# Patient Record
Sex: MALE | Race: WHITE | NOT HISPANIC OR LATINO | Employment: OTHER | URBAN - METROPOLITAN AREA
[De-identification: names, ages, dates, MRNs, and addresses within clinical notes are randomized per-mention and may not be internally consistent; named-entity substitution may affect disease eponyms.]

---

## 2022-04-10 ENCOUNTER — HISTORICAL (OUTPATIENT)
Dept: ADMINISTRATIVE | Facility: HOSPITAL | Age: 87
End: 2022-04-10
Payer: MEDICARE

## 2022-04-30 VITALS
SYSTOLIC BLOOD PRESSURE: 108 MMHG | WEIGHT: 164 LBS | BODY MASS INDEX: 23.48 KG/M2 | HEIGHT: 70 IN | DIASTOLIC BLOOD PRESSURE: 50 MMHG

## 2022-07-18 VITALS
DIASTOLIC BLOOD PRESSURE: 50 MMHG | BODY MASS INDEX: 23.48 KG/M2 | SYSTOLIC BLOOD PRESSURE: 108 MMHG | WEIGHT: 164 LBS | HEIGHT: 70 IN

## 2022-07-18 RX ORDER — MEMANTINE HYDROCHLORIDE 10 MG/1
10 TABLET ORAL 2 TIMES DAILY
COMMUNITY
Start: 2022-01-19 | End: 2022-07-19 | Stop reason: SDUPTHER

## 2022-07-18 RX ORDER — IPRATROPIUM BROMIDE 42 UG/1
2 SPRAY, METERED NASAL 4 TIMES DAILY
COMMUNITY
Start: 2022-01-19

## 2022-07-19 ENCOUNTER — OFFICE VISIT (OUTPATIENT)
Dept: NEUROLOGY | Facility: CLINIC | Age: 87
End: 2022-07-19
Payer: MEDICARE

## 2022-07-19 VITALS
HEIGHT: 70 IN | BODY MASS INDEX: 23.48 KG/M2 | SYSTOLIC BLOOD PRESSURE: 98 MMHG | DIASTOLIC BLOOD PRESSURE: 62 MMHG | WEIGHT: 164 LBS

## 2022-07-19 DIAGNOSIS — F02.80 ALZHEIMER DISEASE: Primary | ICD-10-CM

## 2022-07-19 DIAGNOSIS — G30.9 ALZHEIMER DISEASE: Primary | ICD-10-CM

## 2022-07-19 PROCEDURE — 3078F DIAST BP <80 MM HG: CPT | Mod: CPTII,S$GLB,, | Performed by: PSYCHIATRY & NEUROLOGY

## 2022-07-19 PROCEDURE — 3288F FALL RISK ASSESSMENT DOCD: CPT | Mod: CPTII,S$GLB,, | Performed by: PSYCHIATRY & NEUROLOGY

## 2022-07-19 PROCEDURE — 3074F SYST BP LT 130 MM HG: CPT | Mod: CPTII,S$GLB,, | Performed by: PSYCHIATRY & NEUROLOGY

## 2022-07-19 PROCEDURE — 3074F PR MOST RECENT SYSTOLIC BLOOD PRESSURE < 130 MM HG: ICD-10-PCS | Mod: CPTII,S$GLB,, | Performed by: PSYCHIATRY & NEUROLOGY

## 2022-07-19 PROCEDURE — 99999 PR PBB SHADOW E&M-EST. PATIENT-LVL III: CPT | Mod: PBBFAC,,, | Performed by: PSYCHIATRY & NEUROLOGY

## 2022-07-19 PROCEDURE — 1101F PT FALLS ASSESS-DOCD LE1/YR: CPT | Mod: CPTII,S$GLB,, | Performed by: PSYCHIATRY & NEUROLOGY

## 2022-07-19 PROCEDURE — 3078F PR MOST RECENT DIASTOLIC BLOOD PRESSURE < 80 MM HG: ICD-10-PCS | Mod: CPTII,S$GLB,, | Performed by: PSYCHIATRY & NEUROLOGY

## 2022-07-19 PROCEDURE — 3288F PR FALLS RISK ASSESSMENT DOCUMENTED: ICD-10-PCS | Mod: CPTII,S$GLB,, | Performed by: PSYCHIATRY & NEUROLOGY

## 2022-07-19 PROCEDURE — 1101F PR PT FALLS ASSESS DOC 0-1 FALLS W/OUT INJ PAST YR: ICD-10-PCS | Mod: CPTII,S$GLB,, | Performed by: PSYCHIATRY & NEUROLOGY

## 2022-07-19 PROCEDURE — 99214 OFFICE O/P EST MOD 30 MIN: CPT | Mod: S$GLB,,, | Performed by: PSYCHIATRY & NEUROLOGY

## 2022-07-19 PROCEDURE — 99999 PR PBB SHADOW E&M-EST. PATIENT-LVL III: ICD-10-PCS | Mod: PBBFAC,,, | Performed by: PSYCHIATRY & NEUROLOGY

## 2022-07-19 PROCEDURE — 1159F PR MEDICATION LIST DOCUMENTED IN MEDICAL RECORD: ICD-10-PCS | Mod: CPTII,S$GLB,, | Performed by: PSYCHIATRY & NEUROLOGY

## 2022-07-19 PROCEDURE — 1159F MED LIST DOCD IN RCRD: CPT | Mod: CPTII,S$GLB,, | Performed by: PSYCHIATRY & NEUROLOGY

## 2022-07-19 PROCEDURE — 99214 PR OFFICE/OUTPT VISIT, EST, LEVL IV, 30-39 MIN: ICD-10-PCS | Mod: S$GLB,,, | Performed by: PSYCHIATRY & NEUROLOGY

## 2022-07-19 RX ORDER — TAMSULOSIN HYDROCHLORIDE 0.4 MG/1
0.4 CAPSULE ORAL DAILY
COMMUNITY

## 2022-07-19 RX ORDER — EPINEPHRINE 0.3 MG/.3ML
1 INJECTION SUBCUTANEOUS ONCE
COMMUNITY

## 2022-07-19 RX ORDER — FUROSEMIDE 20 MG/1
20 TABLET ORAL
COMMUNITY

## 2022-07-19 RX ORDER — TELMISARTAN 40 MG/1
TABLET ORAL DAILY
COMMUNITY

## 2022-07-19 RX ORDER — MEMANTINE HYDROCHLORIDE 10 MG/1
10 TABLET ORAL 2 TIMES DAILY
Qty: 180 TABLET | Refills: 3 | Status: SHIPPED | OUTPATIENT
Start: 2022-07-19 | End: 2023-07-18 | Stop reason: SDUPTHER

## 2022-07-19 RX ORDER — DONEPEZIL HYDROCHLORIDE 10 MG/1
10 TABLET, FILM COATED ORAL NIGHTLY
COMMUNITY
End: 2022-07-19 | Stop reason: SDUPTHER

## 2022-07-19 RX ORDER — DONEPEZIL HYDROCHLORIDE 10 MG/1
10 TABLET, FILM COATED ORAL DAILY
Qty: 90 TABLET | Refills: 3 | Status: SHIPPED | OUTPATIENT
Start: 2022-07-19 | End: 2023-07-18 | Stop reason: SDUPTHER

## 2022-07-19 RX ORDER — ROSUVASTATIN CALCIUM 20 MG/1
20 TABLET, COATED ORAL DAILY
COMMUNITY

## 2022-07-19 NOTE — PROGRESS NOTES
"Subjective:       Patient ID: Karon Gauthier is a 86 y.o. male.    Chief Complaint: Dementia (C/o difficulty with names. Wife states "about the same". Recent return from [place], handled trip well)    HPI no new medical issues  No flals/hallucinations  In good health  Handled [place] trip great  Remembers most family members  On aricept/namenda  Wife had dz related ?s    mini-mental status exam    Review of Systems    The remainder of the 14 system ROS is noncontributory or negative unless mentioned/reviewed above.    Objective:      Physical Exam  Mental Status: Alert and oriented x3. Language is fluent with good comprehension.    Cranial Nerve: Pupils are equal, round, and reactive to light. Visual fields are intact to confrontation. Normal fundi. Ocular movements are intact. Face is symmetric at rest and with activation with intact sensation throughout. Hearing intact to finger rub bilaterally. Muscles of tongue and palate activate symmetrically. No dysarthria. Strength is full in sternocleidomastoid and trapezius bilaterally.    Motor: Muscle bulk and tone are normal. Strength is 5/5 in all four extremities both proximally and distally. Intact fine motor movements bilaterally. There is no pronator drift or satelliting on arm roll.    Sensory: Sensation is intact to light touch, pinprick, vibration, and proprioception throughout. Romberg is negative.    Reflexes: 2+ and symmetric at the biceps, triceps, brachioradialis, patella, and Achilles bilaterally. Plantar response is flexor bilaterally.    Coordination: No dysmetria on finger-nose-finger or heel-knee-shin. Normal rapid alternating movements. Fast finger tapping with normal amplitude and speed.    Gait: Narrow based with normal stride length and good arm swing bilaterally. Able to walk on heels, toes, and in tandem.    Assessment:       Problem List Items Addressed This Visit    None     Visit Diagnoses     Alzheimer disease    -  Primary    Relevant " Medications    donepeziL (ARICEPT) 10 MG tablet    memantine (NAMENDA) 10 MG Tab          Plan:       AD  Early  Aricept/namenda  rtc 1 year

## 2023-07-18 ENCOUNTER — OFFICE VISIT (OUTPATIENT)
Dept: NEUROLOGY | Facility: CLINIC | Age: 88
End: 2023-07-18
Payer: MEDICARE

## 2023-07-18 VITALS
BODY MASS INDEX: 25.03 KG/M2 | WEIGHT: 174.81 LBS | DIASTOLIC BLOOD PRESSURE: 62 MMHG | HEIGHT: 70 IN | SYSTOLIC BLOOD PRESSURE: 142 MMHG

## 2023-07-18 DIAGNOSIS — F02.80 ALZHEIMER DISEASE: ICD-10-CM

## 2023-07-18 DIAGNOSIS — G30.9 ALZHEIMER DISEASE: ICD-10-CM

## 2023-07-18 PROCEDURE — 1101F PR PT FALLS ASSESS DOC 0-1 FALLS W/OUT INJ PAST YR: ICD-10-PCS | Mod: CPTII,S$GLB,, | Performed by: PSYCHIATRY & NEUROLOGY

## 2023-07-18 PROCEDURE — 3288F PR FALLS RISK ASSESSMENT DOCUMENTED: ICD-10-PCS | Mod: CPTII,S$GLB,, | Performed by: PSYCHIATRY & NEUROLOGY

## 2023-07-18 PROCEDURE — 3078F DIAST BP <80 MM HG: CPT | Mod: CPTII,S$GLB,, | Performed by: PSYCHIATRY & NEUROLOGY

## 2023-07-18 PROCEDURE — 1101F PT FALLS ASSESS-DOCD LE1/YR: CPT | Mod: CPTII,S$GLB,, | Performed by: PSYCHIATRY & NEUROLOGY

## 2023-07-18 PROCEDURE — 99213 OFFICE O/P EST LOW 20 MIN: CPT | Mod: S$GLB,,, | Performed by: PSYCHIATRY & NEUROLOGY

## 2023-07-18 PROCEDURE — 3078F PR MOST RECENT DIASTOLIC BLOOD PRESSURE < 80 MM HG: ICD-10-PCS | Mod: CPTII,S$GLB,, | Performed by: PSYCHIATRY & NEUROLOGY

## 2023-07-18 PROCEDURE — 99999 PR PBB SHADOW E&M-EST. PATIENT-LVL III: ICD-10-PCS | Mod: PBBFAC,,, | Performed by: PSYCHIATRY & NEUROLOGY

## 2023-07-18 PROCEDURE — 3288F FALL RISK ASSESSMENT DOCD: CPT | Mod: CPTII,S$GLB,, | Performed by: PSYCHIATRY & NEUROLOGY

## 2023-07-18 PROCEDURE — 3077F SYST BP >= 140 MM HG: CPT | Mod: CPTII,S$GLB,, | Performed by: PSYCHIATRY & NEUROLOGY

## 2023-07-18 PROCEDURE — 99999 PR PBB SHADOW E&M-EST. PATIENT-LVL III: CPT | Mod: PBBFAC,,, | Performed by: PSYCHIATRY & NEUROLOGY

## 2023-07-18 PROCEDURE — 1159F MED LIST DOCD IN RCRD: CPT | Mod: CPTII,S$GLB,, | Performed by: PSYCHIATRY & NEUROLOGY

## 2023-07-18 PROCEDURE — 3077F PR MOST RECENT SYSTOLIC BLOOD PRESSURE >= 140 MM HG: ICD-10-PCS | Mod: CPTII,S$GLB,, | Performed by: PSYCHIATRY & NEUROLOGY

## 2023-07-18 PROCEDURE — 1159F PR MEDICATION LIST DOCUMENTED IN MEDICAL RECORD: ICD-10-PCS | Mod: CPTII,S$GLB,, | Performed by: PSYCHIATRY & NEUROLOGY

## 2023-07-18 PROCEDURE — 99213 PR OFFICE/OUTPT VISIT, EST, LEVL III, 20-29 MIN: ICD-10-PCS | Mod: S$GLB,,, | Performed by: PSYCHIATRY & NEUROLOGY

## 2023-07-18 RX ORDER — MEMANTINE HYDROCHLORIDE 10 MG/1
10 TABLET ORAL 2 TIMES DAILY
Qty: 180 TABLET | Refills: 3 | Status: SHIPPED | OUTPATIENT
Start: 2023-07-18

## 2023-07-18 RX ORDER — MONTELUKAST SODIUM 10 MG/1
10 TABLET ORAL NIGHTLY
COMMUNITY
Start: 2023-07-06

## 2023-07-18 RX ORDER — DONEPEZIL HYDROCHLORIDE 10 MG/1
10 TABLET, FILM COATED ORAL DAILY
Qty: 90 TABLET | Refills: 3 | Status: SHIPPED | OUTPATIENT
Start: 2023-07-18

## 2023-07-18 RX ORDER — OMEPRAZOLE 40 MG/1
40 CAPSULE, DELAYED RELEASE ORAL
COMMUNITY
Start: 2023-06-26

## 2023-07-18 RX ORDER — SILDENAFIL 50 MG/1
50 TABLET, FILM COATED ORAL
COMMUNITY
Start: 2023-03-22

## 2023-07-18 NOTE — PROGRESS NOTES
Subjective     Patient ID: Karon Gauthier is a 87 y.o. male.    Chief Complaint: Alzheimer disease    HPI  Anomia is worse  No major decline  Local driving is ok  On aricept/namenda  Independent with ADLs  Review of Systems  The remainder of the 14 system ROS is noncontributory or negative unless mentioned/reviewed above.       Objective     Physical Exam  Mental Status: Alert and oriented x3. Language is fluent with good comprehension.    Cranial Nerve: Pupils are equal, round, and reactive to light. Visual fields are intact to confrontation. Normal fundi. Ocular movements are intact. Face is symmetric at rest and with activation with intact sensation throughout. Hearing intact to finger rub bilaterally. Muscles of tongue and palate activate symmetrically. No dysarthria. Strength is full in sternocleidomastoid and trapezius bilaterally.    Motor: Muscle bulk and tone are normal. Strength is 5/5 in all four extremities both proximally and distally. Intact fine motor movements bilaterally. There is no pronator drift or satelliting on arm roll.    Sensory: Sensation is intact to light touch, pinprick, vibration, and proprioception throughout. Romberg is negative.    Reflexes: 2+ and symmetric at the biceps, triceps, brachioradialis, patella, and Achilles bilaterally. Plantar response is flexor bilaterally.    Coordination: No dysmetria on finger-nose-finger or heel-knee-shin. Normal rapid alternating movements. Fast finger tapping with normal amplitude and speed.    Gait: Narrow based with normal stride length and good arm swing bilaterally. Able to walk on heels, toes, and in tandem.       Assessment and Plan     1. Alzheimer disease  -     donepeziL (ARICEPT) 10 MG tablet; Take 1 tablet (10 mg total) by mouth once daily.  Dispense: 90 tablet; Refill: 3  -     memantine (NAMENDA) 10 MG Tab; Take 1 tablet (10 mg total) by mouth 2 (two) times daily.  Dispense: 180 tablet; Refill: 3        Meds refilled            Follow up in about 1 year (around 7/18/2024).

## 2023-12-13 ENCOUNTER — HOSPITAL ENCOUNTER (INPATIENT)
Facility: HOSPITAL | Age: 88
LOS: 2 days | Discharge: HOME HEALTH | DRG: 084 | End: 2023-12-15
Attending: EMERGENCY MEDICINE | Admitting: SURGERY
Payer: MEDICARE

## 2023-12-13 ENCOUNTER — TELEPHONE (OUTPATIENT)
Dept: NEUROLOGY | Facility: CLINIC | Age: 88
End: 2023-12-13
Payer: MEDICARE

## 2023-12-13 DIAGNOSIS — S06.5XAA SDH (SUBDURAL HEMATOMA): ICD-10-CM

## 2023-12-13 DIAGNOSIS — R94.31 ABNORMAL ELECTROCARDIOGRAM: ICD-10-CM

## 2023-12-13 DIAGNOSIS — I62.01 ACUTE ON CHRONIC INTRACRANIAL SUBDURAL HEMATOMA: Primary | ICD-10-CM

## 2023-12-13 DIAGNOSIS — I62.03 ACUTE ON CHRONIC INTRACRANIAL SUBDURAL HEMATOMA: Primary | ICD-10-CM

## 2023-12-13 LAB
ALBUMIN SERPL-MCNC: 4 G/DL (ref 3.4–4.8)
ALBUMIN/GLOB SERPL: 1.1 RATIO (ref 1.1–2)
ALP SERPL-CCNC: 72 UNIT/L (ref 40–150)
ALT SERPL-CCNC: 36 UNIT/L (ref 0–55)
APTT PPP: 25.3 SECONDS (ref 23.2–33.7)
AST SERPL-CCNC: 28 UNIT/L (ref 5–34)
BASOPHILS # BLD AUTO: 0.05 X10(3)/MCL
BASOPHILS NFR BLD AUTO: 0.7 %
BILIRUB SERPL-MCNC: 0.5 MG/DL
BUN SERPL-MCNC: 20.2 MG/DL (ref 8.4–25.7)
CALCIUM SERPL-MCNC: 10 MG/DL (ref 8.8–10)
CHLORIDE SERPL-SCNC: 103 MMOL/L (ref 98–107)
CO2 SERPL-SCNC: 29 MMOL/L (ref 23–31)
CREAT SERPL-MCNC: 1.22 MG/DL (ref 0.73–1.18)
EOSINOPHIL # BLD AUTO: 0.33 X10(3)/MCL (ref 0–0.9)
EOSINOPHIL NFR BLD AUTO: 4.4 %
ERYTHROCYTE [DISTWIDTH] IN BLOOD BY AUTOMATED COUNT: 12.8 % (ref 11.5–17)
GFR SERPLBLD CREATININE-BSD FMLA CKD-EPI: 58 MLS/MIN/1.73/M2
GLOBULIN SER-MCNC: 3.6 GM/DL (ref 2.4–3.5)
GLUCOSE SERPL-MCNC: 178 MG/DL (ref 82–115)
HCT VFR BLD AUTO: 42.7 % (ref 42–52)
HGB BLD-MCNC: 13.6 G/DL (ref 14–18)
IMM GRANULOCYTES # BLD AUTO: 0.09 X10(3)/MCL (ref 0–0.04)
IMM GRANULOCYTES NFR BLD AUTO: 1.2 %
INR PPP: 1
LACTATE SERPL-SCNC: 1.1 MMOL/L (ref 0.5–2.2)
LACTATE SERPL-SCNC: 2.2 MMOL/L (ref 0.5–2.2)
LACTATE SERPL-SCNC: 3.6 MMOL/L (ref 0.5–2.2)
LYMPHOCYTES # BLD AUTO: 2.75 X10(3)/MCL (ref 0.6–4.6)
LYMPHOCYTES NFR BLD AUTO: 36.8 %
MCH RBC QN AUTO: 30.8 PG (ref 27–31)
MCHC RBC AUTO-ENTMCNC: 31.9 G/DL (ref 33–36)
MCV RBC AUTO: 96.6 FL (ref 80–94)
MONOCYTES # BLD AUTO: 0.44 X10(3)/MCL (ref 0.1–1.3)
MONOCYTES NFR BLD AUTO: 5.9 %
NEUTROPHILS # BLD AUTO: 3.81 X10(3)/MCL (ref 2.1–9.2)
NEUTROPHILS NFR BLD AUTO: 51 %
NRBC BLD AUTO-RTO: 0 %
PLATELET # BLD AUTO: 199 X10(3)/MCL (ref 130–400)
PMV BLD AUTO: 8.7 FL (ref 7.4–10.4)
POTASSIUM SERPL-SCNC: 4 MMOL/L (ref 3.5–5.1)
PROT SERPL-MCNC: 7.6 GM/DL (ref 5.8–7.6)
PROTHROMBIN TIME: 13 SECONDS (ref 12.5–14.5)
RBC # BLD AUTO: 4.42 X10(6)/MCL (ref 4.7–6.1)
SODIUM SERPL-SCNC: 138 MMOL/L (ref 136–145)
WBC # SPEC AUTO: 7.47 X10(3)/MCL (ref 4.5–11.5)

## 2023-12-13 PROCEDURE — 83605 ASSAY OF LACTIC ACID: CPT | Performed by: NURSE PRACTITIONER

## 2023-12-13 PROCEDURE — 25000003 PHARM REV CODE 250: Performed by: EMERGENCY MEDICINE

## 2023-12-13 PROCEDURE — 85610 PROTHROMBIN TIME: CPT | Performed by: PHYSICIAN ASSISTANT

## 2023-12-13 PROCEDURE — 85730 THROMBOPLASTIN TIME PARTIAL: CPT | Performed by: PHYSICIAN ASSISTANT

## 2023-12-13 PROCEDURE — 20800000 HC ICU TRAUMA

## 2023-12-13 PROCEDURE — 63600175 PHARM REV CODE 636 W HCPCS: Performed by: EMERGENCY MEDICINE

## 2023-12-13 PROCEDURE — 25000003 PHARM REV CODE 250: Performed by: NURSE PRACTITIONER

## 2023-12-13 PROCEDURE — 85025 COMPLETE CBC W/AUTO DIFF WBC: CPT | Performed by: PHYSICIAN ASSISTANT

## 2023-12-13 PROCEDURE — 99285 EMERGENCY DEPT VISIT HI MDM: CPT | Mod: 25

## 2023-12-13 PROCEDURE — 80053 COMPREHEN METABOLIC PANEL: CPT | Performed by: PHYSICIAN ASSISTANT

## 2023-12-13 PROCEDURE — 96365 THER/PROPH/DIAG IV INF INIT: CPT

## 2023-12-13 RX ORDER — LEVETIRACETAM 500 MG/1
500 TABLET ORAL 2 TIMES DAILY
Status: DISCONTINUED | OUTPATIENT
Start: 2023-12-13 | End: 2023-12-15 | Stop reason: HOSPADM

## 2023-12-13 RX ORDER — DOCUSATE SODIUM 100 MG/1
100 CAPSULE, LIQUID FILLED ORAL 2 TIMES DAILY
Status: DISCONTINUED | OUTPATIENT
Start: 2023-12-13 | End: 2023-12-15 | Stop reason: HOSPADM

## 2023-12-13 RX ORDER — BISACODYL 10 MG
10 SUPPOSITORY, RECTAL RECTAL DAILY PRN
Status: DISCONTINUED | OUTPATIENT
Start: 2023-12-13 | End: 2023-12-15 | Stop reason: HOSPADM

## 2023-12-13 RX ORDER — POLYETHYLENE GLYCOL 3350 17 G/17G
17 POWDER, FOR SOLUTION ORAL 2 TIMES DAILY
Status: DISCONTINUED | OUTPATIENT
Start: 2023-12-13 | End: 2023-12-15 | Stop reason: HOSPADM

## 2023-12-13 RX ORDER — SODIUM CHLORIDE 9 MG/ML
INJECTION, SOLUTION INTRAVENOUS CONTINUOUS
Status: DISCONTINUED | OUTPATIENT
Start: 2023-12-13 | End: 2023-12-15 | Stop reason: HOSPADM

## 2023-12-13 RX ORDER — FAMOTIDINE 20 MG/1
20 TABLET, FILM COATED ORAL 2 TIMES DAILY
Status: DISCONTINUED | OUTPATIENT
Start: 2023-12-13 | End: 2023-12-15 | Stop reason: HOSPADM

## 2023-12-13 RX ORDER — ACETAMINOPHEN 325 MG/1
650 TABLET ORAL EVERY 4 HOURS
Status: DISCONTINUED | OUTPATIENT
Start: 2023-12-13 | End: 2023-12-15 | Stop reason: HOSPADM

## 2023-12-13 RX ORDER — TALC
6 POWDER (GRAM) TOPICAL NIGHTLY PRN
Status: DISCONTINUED | OUTPATIENT
Start: 2023-12-13 | End: 2023-12-15 | Stop reason: HOSPADM

## 2023-12-13 RX ORDER — OXYCODONE HYDROCHLORIDE 5 MG/1
5 TABLET ORAL EVERY 4 HOURS PRN
Status: DISCONTINUED | OUTPATIENT
Start: 2023-12-13 | End: 2023-12-15 | Stop reason: HOSPADM

## 2023-12-13 RX ADMIN — ACETAMINOPHEN 650 MG: 325 TABLET, FILM COATED ORAL at 03:12

## 2023-12-13 RX ADMIN — LEVETIRACETAM 500 MG: 100 INJECTION, SOLUTION INTRAVENOUS at 01:12

## 2023-12-13 RX ADMIN — DOCUSATE SODIUM 100 MG: 100 CAPSULE, LIQUID FILLED ORAL at 08:12

## 2023-12-13 RX ADMIN — SODIUM CHLORIDE: 9 INJECTION, SOLUTION INTRAVENOUS at 02:12

## 2023-12-13 RX ADMIN — FAMOTIDINE 20 MG: 20 TABLET, FILM COATED ORAL at 08:12

## 2023-12-13 RX ADMIN — SODIUM CHLORIDE 1000 ML: 9 INJECTION, SOLUTION INTRAVENOUS at 04:12

## 2023-12-13 RX ADMIN — LEVETIRACETAM 500 MG: 500 TABLET, FILM COATED ORAL at 08:12

## 2023-12-13 RX ADMIN — POLYETHYLENE GLYCOL 3350 17 G: 17 POWDER, FOR SOLUTION ORAL at 03:12

## 2023-12-13 NOTE — TELEPHONE ENCOUNTER
Pt's son  called reporting pt experienced a head injury approximately a month ago & was evaluated @ the ED in [place], [place] where a CT was performed.    reports since head injury pt has been experiencing increased head pain. Pt's PCP ordered an MRI of the brain which was performed yesterday at [facility].  He reports MRI revealed bleeding on the brain. He reports [facility] attempted to transfer pt for nsgy consult, but were unable to do so d/t bed availability. He reports they were advised to report to [facility] ED but decided not to d/t wait times.   He is inquiring on if there are any recommendations or anything we can do from our end.    LOV 7/18/2023  NOV 7/16/2024    # [phone]   ANESTHESIA to PACU NOTE/Transfer Note

## 2023-12-13 NOTE — CONSULTS
[facility]  Emergency Dept  Neurosurgery  Consult Note    Inpatient consult to Neurosurgery  Consult performed by: Mrago Maddox PA  Consult ordered by: Kay Wall MD        Subjective:     Chief Complaint/Reason for Admission: HA    History of Present Illness: Patient is an 88-year-old male with PMHx significant for GERD, BPH and dementia, who presents to the hospital status post multiple falls with the last several weeks.  By report 1st fall was 5-6 weeks ago and was seen at outside hospital and worked up and CT scans were negative for acute injury and he was discharged. He continued having headache since that time. He did have another fall 1 week ago and it was unclear if he hit his head at that time.  Due to the continued headaches patient's family brought him to PCP and MRI was ordered at [facility] and noted to have acute on chronic subdural hematomas. [facility] attempted to transfer the patient but we were on divert. The patient presents here via car with his significant other's the bedside.     CT head was repeated and shows bilateral subdural hematomas.  The left-sided subdural hematoma shows multiple areas of loculations which suggest a chronic subdural hematoma but also has some areas of hyperattenuation in the posterior parietal region concerning for acute components. Right-sided subdural hematoma appears to be acute to subacute and has areas acute hemorrhage within. Midline shift from left to right measuring 3.8 mm. Dr. Masters was consulted for evaluation and treatment recommendations.    On PE today he is sitting up in the stretcher, NAD. He c/o head pain around a laceration to the back of his head. He currently denies HA, blurred vision and N/V. He is alert, oriented to person and place. Confused on year. Wife reports he is at baseline. No other complaints at time of rounds.    Review of patient's allergies indicates:   Allergen Reactions    Allergen ext-venom-honey bee Hives        Past Medical History:   Diagnosis Date    Alzheimer disease     BPH (benign prostatic hyperplasia)     Diabetes mellitus     HLD (hyperlipidemia)     Hypertension     Male erectile dysfunction, unspecified     Pharyngeal dysphagia     TIA (transient ischemic attack)      Past Surgical History:   Procedure Laterality Date    Excision of Skin Lesion       Family History    None       Tobacco Use    Smoking status: Former     Current packs/day: 0.00     Types: Cigarettes     Quit date:      Years since quittin.9    Smokeless tobacco: Never   Substance and Sexual Activity    Alcohol use: Yes     Comment: 1-2x/week    Drug use: Never    Sexual activity: Not on file     Review of Systems  Objective:     Weight: 77.1 kg (170 lb)  Body mass index is 25.1 kg/m².  Vital Signs (Most Recent):  Temp: 97.5 °F (36.4 °C) (23 1100)  Pulse: (!) 58 (23 1331)  Resp: 14 (23 1331)  BP: 136/77 (23 1331)  SpO2: 98 % (23 1331) Vital Signs (24h Range):  Temp:  [97.5 °F (36.4 °C)] 97.5 °F (36.4 °C)  Pulse:  [58-76] 58  Resp:  [11-20] 14  SpO2:  [94 %-100 %] 98 %  BP: (115-147)/(69-87) 136/77     Physical Exam:  Nursing note and vitals reviewed.    Constitutional: He appears well-developed and well-nourished. No distress.     Eyes:   Small pupils d/t several eye surgeries bilateral     Cardiovascular: Intact distal pulses.     Abdominal: Soft.     Skin:   Small laceration to back of head     Psych/Behavior:   Alert, oriented to person and place. Confused on situation and year. Follows commands briskly in all ext.     Musculoskeletal:        Neck: Range of motion is full.        Back: Range of motion is full.        Right Upper Extremities: Range of motion is full.        Left Upper Extremities: Range of motion is full.       Right Lower Extremities: Range of motion is full.        Left Lower Extremities: Range of motion is full.     Neurological:        Sensory: There is no sensory deficit in the  trunk. There is no sensory deficit in the extremities.        DTRs: He displays no Babinski's sign on the right side. He displays no Babinski's sign on the left side.        Cranial nerves: Cranial nerve(s) II, III, IV, V, VI, VII, VIII, IX, X, XI and XII are intact.     Pronator drift on the left    Significant Labs:  Recent Labs   Lab 12/13/23  1126      K 4.0   CO2 29   BUN 20.2   CREATININE 1.22*   CALCIUM 10.0     Recent Labs   Lab 12/13/23  1126   WBC 7.47   HGB 13.6*   HCT 42.7        Recent Labs   Lab 12/13/23  1126   INR 1.0     Microbiology Results (last 7 days)       ** No results found for the last 168 hours. **            Significant Diagnostics:  CT Head Without Contrast [[id number]] (Abnormal) Resulted: 12/13/23 1224   Order Status: Completed Updated: 12/13/23 1226   Narrative:     EXAMINATION:  CT HEAD WITHOUT CONTRAST    CLINICAL HISTORY:  Subdural hemorrhage;    TECHNIQUE:  Multiple axial images were obtained from the base of the brain to the vertex without contrast administration.  Sagittal and coronal reconstructions were performed. .Automatic exposure control  (AEC) is utilized to reduce patient radiation exposure.    COMPARISON:  MRI from 12/12/2023    FINDINGS:  There are bilateral large subdural hematoma seen.  There is a subdural hematoma seen on the left side extending from the frontal to the posterior parietal region and towards the cerebral convexity.  The maximum thickness of the subdural hematoma on the left side is 3.1 cm.  Similar areas were seen on the examination performed yesterday.  The previous MRI showed multiple areas of loculation within the there is subdural hemorrhage.  There are also some areas of hyperattenuation within the subdural hemorrhage on the left side.  Findings could represent acute on chronic subdural.    There is a subdural hematoma seen in the right frontal and parietal and temporal region.  This was seen on the prior examination as well.  There  appear to be some acute components in the posterior parietal region.  Maximum thickness of the subdural hematoma on the right side is 13 mm and appears similar to the prior examination.  No parenchymal hemorrhage is seen.  There is some mass effect from the left-sided subdural hematoma and there is a midline shift from left to right measuring 3.8 mm.  The ventricles and basal cisterns appear grossly unremarkable.  Posterior fossa appears grossly unremarkable.    Calvarium is intact.  Paranasal sinuses appear unremarkable.   Impression:       Bilateral subdural hematoma seen as outlined above.  The left-sided subdural hematoma shows multiple areas of loculations which suggest a chronic subdural hematoma but also has some areas of hyperattenuation in the posterior parietal region concerning for acute components.    Right-sided subdural hematoma appears to be acute to subacute and has areas acute hemorrhage within.    Midline shift from left to right measuring 3.8 mm       Assessment/Plan:     Active Diagnoses:    Diagnosis Date Noted POA    PRINCIPAL PROBLEM:  SDH (subdural hematoma) [S06.5XAA] 12/13/2023 Yes      Problems Resolved During this Admission:     He is GCS14 with minimal c/o HA.  CT reviewed; acute on chronic SDH bilateral, left worse than right  He will likely need surgery, tentatively planned for Friday  He will need cardiology clearance  For now, admit to ICU with Q1 hour neuro exams  BP parameters below 140/90  OK for diet now, NPO after midnight  Keppra BID  SCDs for DVT prophylaxis; hold anticoagulants  Repeat CT head in am    Thank you for your consult. I will follow-up with patient. Please contact us if you have any additional questions.    MITRA King  Neurosurgery  [facility] - Emergency Dept

## 2023-12-13 NOTE — CONSULTS
[facility]  Emergency Dept  Trauma Surgery  History & Physical    Patient Name: Karon Gauthier  MRN: [id number]  Admission Date: 12/13/2023  Attending Physician: Orlando Lozoya MD   Primary Care Provider: Leyda Khoury MD    Patient information was obtained from patient, spouse/SO, and ER records.     Subjective:     Chief Complaint/Reason for Admission: falls, headache    History of Present Illness: 88-year-old male presents to the hospital status post multiple falls with the last several weeks.  By report 1st fall was 3 or 4 weeks ago and was seen at outside hospital and worked up and CT scans were negative for acute injury and he was discharged.  He continued having headache since this time.  He did have a fall 1 week ago and it was unclear if he hit his head at that time.  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  The patient presents here with his significant other's the bedside.  He was have a history of dementia in his it was neurologic baseline.  He was aware of his name, the situation, his white but he was unable to tell me his name.  He was calm and cooperative.  He has a healing skin tear laceration to the occiput of his head from the initial fall for 5 weeks ago.  Hemodynamically he was stable.  He has been evaluated by Neurosurgery who recommends admission to the ICU with q.1h neuro checks and Keppra.  Lovenox will of course be held.  Family is aware of the plan and agreeable.  Other than dementia the patient was past medical history of gastrointestinal reflux and benign prostatic hypertrophy.    No new subjective & objective note has been filed under this hospital service since the last note was generated.    No current facility-administered medications on file prior to encounter.           Current Outpatient Medications on File Prior to Encounter   Medication Sig    donepeziL  (ARICEPT) 10 MG tablet Take 1 tablet (10 mg total) by mouth once daily.    EPINEPHrine (EPIPEN) 0.3 mg/0.3 mL AtIn Inject 1 each into the muscle once.    furosemide (LASIX) 20 MG tablet Take 20 mg by mouth 3 (three) times a week.    ipratropium (ATROVENT) 42 mcg (0.06 %) nasal spray 2 sprays by Nasal route 4 (four) times daily.    memantine (NAMENDA) 10 MG Tab Take 1 tablet (10 mg total) by mouth 2 (two) times daily.    montelukast (SINGULAIR) 10 mg tablet Take 10 mg by mouth every evening.    omeprazole (PRILOSEC) 40 MG capsule Take 40 mg by mouth.    rosuvastatin (CRESTOR) 20 MG tablet Take 20 mg by mouth once daily.    sildenafiL (VIAGRA) 50 MG tablet Take 50 mg by mouth.    tamsulosin (FLOMAX) 0.4 mg Cap Take 0.4 mg by mouth once daily.    telmisartan (MICARDIS) 40 MG Tab Take by mouth once daily.              Review of patient's allergies indicates:   Allergen Reactions    Allergen ext-venom-honey bee Hives              Past Medical History:   Diagnosis Date    Alzheimer disease      BPH (benign prostatic hyperplasia)      Diabetes mellitus      HLD (hyperlipidemia)      Hypertension      Male erectile dysfunction, unspecified      Pharyngeal dysphagia      TIA (transient ischemic attack)              Past Surgical History:   Procedure Laterality Date    Excision of Skin Lesion          Family History    None               Tobacco Use    Smoking status: Former       Current packs/day: 0.00       Types: Cigarettes       Quit date:        Years since quittin.9    Smokeless tobacco: Never   Substance and Sexual Activity    Alcohol use: Yes       Comment: 1-2x/week    Drug use: Never    Sexual activity: Not on file      Review of Systems   Constitutional:  Negative for chills and fever.   HENT:  Negative for ear pain and trouble swallowing.    Eyes:  Negative for pain and redness.   Respiratory:  Negative for cough and chest tightness.    Cardiovascular:  Negative for chest pain, palpitations and leg  swelling.   Gastrointestinal:  Negative for abdominal distention, abdominal pain, nausea and vomiting.   Genitourinary:  Negative for difficulty urinating.   Musculoskeletal:  Negative for back pain and neck pain.   Skin:  Negative for color change, pallor and wound.   Neurological:  Positive for headaches. Negative for dizziness, syncope, speech difficulty, weakness, light-headedness and numbness.   Psychiatric/Behavioral:  Negative for agitation and suicidal ideas.    All other systems reviewed and are negative.     Objective:      Vital Signs (Most Recent):  Temp: 97.5 °F (36.4 °C) (12/13/23 1100)  Pulse: (!) 58 (12/13/23 1202)  Resp: 12 (12/13/23 1202)  BP: 115/69 (12/13/23 1202)  SpO2: 99 % (12/13/23 1202) Vital Signs (24h Range):  Temp:  [97.5 °F (36.4 °C)] 97.5 °F (36.4 °C)  Pulse:  [58-76] 58  Resp:  [12-20] 12  SpO2:  [94 %-99 %] 99 %  BP: (115-147)/(69-85) 115/69      Weight: 77.1 kg (170 lb)  Body mass index is 25.1 kg/m².     Physical Exam  Constitutional:       Appearance: Normal appearance.   HENT:      Head: Normocephalic and atraumatic.      Nose: Nose normal.   Eyes:      Pupils: Pupils are equal, round, and reactive to light.   Cardiovascular:      Rate and Rhythm: Normal rate.      Pulses: Normal pulses.      Comments: Normal peripheral pulses  Pulmonary:      Effort: Pulmonary effort is normal. No respiratory distress.   Chest:      Chest wall: No tenderness.   Abdominal:      General: Abdomen is flat. Bowel sounds are normal. There is no distension.      Palpations: Abdomen is soft.      Tenderness: There is no abdominal tenderness.   Musculoskeletal:         General: No swelling, tenderness, deformity or signs of injury.      Cervical back: Normal range of motion and neck supple. No tenderness.   Skin:     General: Skin is warm and dry.      Capillary Refill: Capillary refill takes less than 2 seconds.      Findings: No lesion.      Comments: Healing wound to head   Neurological:      General:  No focal deficit present.      Mental Status: He is alert and oriented to person, place, and time. Mental status is at baseline.   Psychiatric:         Mood and Affect: Mood normal.         Behavior: Behavior normal.         Thought Content: Thought content normal.         Judgment: Judgment normal.               I have reviewed all pertinent lab results within the past 24 hours.     Significant Diagnostics:  I have reviewed all pertinent imaging results/findings within the past 24 hours.  Assessment/Plan:     * SDH (subdural hematoma)  Admitted to the ICU   Q.1h neuro checks  Consult neurosurgery   IV fluids  NPO except for meds   Okay for PT and OT   Holding Lovenox due to head bleed   Follow up neurosurgery final recommendations   H2 blocker while NPO   We will restart home medications once entered      VTE Risk Mitigation (From admission, onward)           Ordered     IP VTE HIGH RISK PATIENT  Once         12/13/23 1327     Reason for No Pharmacological VTE Prophylaxis  Once        Question:  Reasons:  Answer:  Physician Provided (leave comment)    12/13/23 1327     Place sequential compression device  Until discontinued         12/13/23 1327                      VITALY Bello  Trauma  [facility] - Emergency Dept

## 2023-12-13 NOTE — ED PROVIDER NOTES
Encounter Date: 12/13/2023       History     Chief Complaint   Patient presents with    Headache     Pt reports fall x1 week ago, hitting head. Pt had MRI yesterday in [place] showing hematoma. - Blood thinners. Pt GCS14, more confused than normal. Pt ambulatory, wife states more unsteady than normal     88-year-old Male with a history of Alzheimer disease presents to the emergency department for evaluation after diagnosed with bilateral subdural hematomas, one chronic appearing in the other acute on chronic appearing, on outside hospital imaging yesterday.  States that he had a fall about 5 or 6 weeks ago during which he sustained a head injury, reportedly was seen at [facility] emergency department with negative imaging at that time by wife's report.  States he has had persistent headaches since that time, follow up with primary care and was prescribed symptomatic treatments without relief.  Evidently had another fall sometime last week but did not complain much at that time.  She contacted the patient's primary care physician and had additional outpatient imaging ordered which was completed yesterday revealing findings as below.  By report, went to [facility], they attempted to transfer the patient here; however, due to capacity reasons, this facility was unable to accept the patient for transfer.  Wife reports that they tried several other facilities but it got to be around 4:00 a.m. this morning and they were tired so they elected to go home and come here on their own this AM.   Reports headaches are intermittent, he denies any noted gait disturbance; however, wife states he has seemed more off balance lately.  No nausea or vomiting.    Reports follows w/ neurology, Dr. Paige outpatient. They are aware of the patient coming to ED today.         MRI brain without contrast - :   There are bilateral subdural hematomas the largest of which is multiloculated and overlies left high parietal as well as frontal  and occipital regions.  There is an associated right-sided subdural hematoma.  The left-sided subdural hematoma with multiple loculations measures 3.4 cm in greatest cross-sectional dimension.  The right-sided hematoma is smaller in size measuring 1.2 cm.  Suspect the left-sided hematoma is chronic with an acute on chronic right-sided subdural hematoma.  There is underlying atrophy so at this time there is mass effect but no herniation.  Mass effect is noted most severely upon the left lateral ventricle.  The internal auditory canals are unremarkable.  The optic globes, optic nerves and surrounding structures are unremarkable.  The paranasal sinuses are clear and well aerated.  The suprasellar cistern basal cisterns and cerebellar pontine angle regions are within normal limits.      MRI cervical spine [id number] 2:00 a.m.   Multiple level spondylosis with no high-grade canal stenosis.  Multiple level spondylosis causing neural foraminal narrowing.        The history is provided by the patient, the spouse and medical records.       Review of patient's allergies indicates:   Allergen Reactions    Allergen ext-venom-honey bee Hives     Past Medical History:   Diagnosis Date    Alzheimer disease     BPH (benign prostatic hyperplasia)     Diabetes mellitus     HLD (hyperlipidemia)     Hypertension     Male erectile dysfunction, unspecified     Pharyngeal dysphagia     TIA (transient ischemic attack)      Past Surgical History:   Procedure Laterality Date    Excision of Skin Lesion       History reviewed. No pertinent family history.  Social History     Tobacco Use    Smoking status: Former     Current packs/day: 0.00     Types: Cigarettes     Quit date:      Years since quittin.9    Smokeless tobacco: Never   Substance Use Topics    Alcohol use: Yes     Comment: 1-2x/week    Drug use: Never     Review of Systems   Constitutional:  Negative for fever.   Gastrointestinal:  Negative for nausea and vomiting.    Neurological:  Positive for headaches. Negative for dizziness.       Physical Exam     Initial Vitals [12/13/23 1100]   BP Pulse Resp Temp SpO2   (!) 144/85 76 20 97.5 °F (36.4 °C) 98 %      MAP       --         Physical Exam    Nursing note and vitals reviewed.  Constitutional: He appears well-developed and well-nourished. No distress.   HENT:   Head: Normocephalic.   Mouth/Throat: Oropharynx is clear and moist.   Scabbed lesion at the vertex, no overt purulent drainage   Eyes: Conjunctivae are normal. No scleral icterus.   Neck: Neck supple.   Normal range of motion.  Cardiovascular:  Normal rate and regular rhythm.           Pulmonary/Chest: Breath sounds normal. No respiratory distress.   Abdominal: Abdomen is soft. He exhibits no distension. There is no abdominal tenderness.   Musculoskeletal:         General: No edema.      Cervical back: Normal range of motion and neck supple.     Neurological: He is alert. He has normal strength. No cranial nerve deficit. GCS score is 15. GCS eye subscore is 4. GCS verbal subscore is 5. GCS motor subscore is 6.   Skin: Skin is warm and dry.         ED Course   Critical Care    Date/Time: 12/13/2023 11:13 AM    Performed by: Kay Wall MD  Authorized by: Kay Wall MD  Direct patient critical care time: 19 minutes  Additional history critical care time: 6 minutes  Ordering / reviewing critical care time: 5 minutes  Documentation critical care time: 4 minutes  Consulting other physicians critical care time: 7 minutes  Total critical care time (exclusive of procedural time) : 41 minutes  Critical care time was exclusive of separately billable procedures and treating other patients.  Critical care was necessary to treat or prevent imminent or life-threatening deterioration of the following conditions: CNS failure or compromise and trauma.  Critical care was time spent personally by me on the following activities: development of treatment plan with patient or  surrogate, discussions with consultants, interpretation of cardiac output measurements, examination of patient, obtaining history from patient or surrogate, ordering and performing treatments and interventions, ordering and review of laboratory studies, ordering and review of radiographic studies, pulse oximetry, re-evaluation of patient's condition and review of old charts.        Labs Reviewed   COMPREHENSIVE METABOLIC PANEL - Abnormal; Notable for the following components:       Result Value    Glucose Level 178 (*)     Creatinine 1.22 (*)     Globulin 3.6 (*)     All other components within normal limits   CBC WITH DIFFERENTIAL - Abnormal; Notable for the following components:    RBC 4.42 (*)     Hgb 13.6 (*)     MCV 96.6 (*)     MCHC 31.9 (*)     IG# 0.09 (*)     All other components within normal limits   LACTIC ACID, PLASMA - Abnormal; Notable for the following components:    Lactic Acid Level 3.6 (*)     All other components within normal limits   APTT - Normal   PROTIME-INR - Normal   LACTIC ACID, PLASMA - Normal   CBC W/ AUTO DIFFERENTIAL    Narrative:     The following orders were created for panel order CBC auto differential.  Procedure                               Abnormality         Status                     ---------                               -----------         ------                     CBC with Differential[[id number]]       Abnormal            Final result                 Please view results for these tests on the individual orders.          Imaging Results               CT Head Without Contrast (Final result)  Result time 12/13/23 12:24:15      Final result by Maria Del Carmen Maldonado MD (12/13/23 12:24:15)                   Impression:      Bilateral subdural hematoma seen as outlined above.  The left-sided subdural hematoma shows multiple areas of loculations which suggest a chronic subdural hematoma but also has some areas of hyperattenuation in the posterior parietal region concerning  for acute components.    Right-sided subdural hematoma appears to be acute to subacute and has areas acute hemorrhage within.    Midline shift from left to right measuring 3.8 mm    This report was flagged in Epic as abnormal.      Electronically signed by: Terence Maldonado  Date:    12/13/2023  Time:    12:24               Narrative:    EXAMINATION:  CT HEAD WITHOUT CONTRAST    CLINICAL HISTORY:  Subdural hemorrhage;    TECHNIQUE:  Multiple axial images were obtained from the base of the brain to the vertex without contrast administration.  Sagittal and coronal reconstructions were performed. .Automatic exposure control  (AEC) is utilized to reduce patient radiation exposure.    COMPARISON:  MRI from 12/12/2023    FINDINGS:  There are bilateral large subdural hematoma seen.  There is a subdural hematoma seen on the left side extending from the frontal to the posterior parietal region and towards the cerebral convexity.  The maximum thickness of the subdural hematoma on the left side is 3.1 cm.  Similar areas were seen on the examination performed yesterday.  The previous MRI showed multiple areas of loculation within the there is subdural hemorrhage.  There are also some areas of hyperattenuation within the subdural hemorrhage on the left side.  Findings could represent acute on chronic subdural.    There is a subdural hematoma seen in the right frontal and parietal and temporal region.  This was seen on the prior examination as well.  There appear to be some acute components in the posterior parietal region.  Maximum thickness of the subdural hematoma on the right side is 13 mm and appears similar to the prior examination.  No parenchymal hemorrhage is seen.  There is some mass effect from the left-sided subdural hematoma and there is a midline shift from left to right measuring 3.8 mm.  The ventricles and basal cisterns appear grossly unremarkable.  Posterior fossa appears grossly unremarkable.    Calvarium is  intact.  Paranasal sinuses appear unremarkable.                                      Medical Decision Making  Problems Addressed:  Acute on chronic intracranial subdural hematoma: acute illness or injury that poses a threat to life or bodily functions  SDH (subdural hematoma): chronic illness or injury with exacerbation, progression, or side effects of treatment    Risk  Decision regarding hospitalization.      ED assessment:    Mr. Gauthier presented with outside hospital imaging concerning for bilateral subdural hematomas.  Presently alert, baseline dementia however at his baseline mentation per wife.  No focal neurologic deficits.  On aspirin daily but no other anticoagulant.  Last traumatic injury about 1 week ago.    Differential diagnosis (including but not limited to):   Close head injury, intracranial hemorrhage, cerebral contusion, concussion, subdural hematoma, mass effect, acquired coagulopathy, platelet dysfunction    ED management:   Outside hospital imaging reviewed, repeat CT obtained given continued headache and acute component on imaging yesterday, findings as above.  Discussed with neurosurgery who advised admit to ICU, q.1 hour neuro checks, antiepileptic prophylaxis, will consider surgical intervention in coming days. Findings and plan discussed with the patient and he is agreeable to admission at this time.     My independent radiology interpretation:   CT head:  Bilateral subdural hematomas    Amount and/or Complexity of Data Reviewed  Independent historian: spouse    Summary of history:  Majority of history provided by spouse due to patient's underlying dementia  External data reviewed: notes from previous ED visits and prior imaging  Summary of data reviewed:  Previous ED visit from yesterday reviewed, offered transfer and attempted multiple facility; however, unable to obtain acceptance Risk and benefits of testing: discussed   Labs: ordered and reviewed  Radiology: ordered and independent  interpretation performed (see above or ED course)    Discussion of management or test interpretation with external provider(s): discussed with trauma surgery, neurosurgery consultant   Summary of discussion: as below    Risk  Decision regarding hospitalization  Shared decision making     Critical Care  30-74 minutes     Kay CAMACHO MD personally performed the history, PE, MDM, and procedures as documented above and agree with the scribe's documentation.              ED Course as of 12/13/23 2310   Wed Dec 13, 2023   1240 Neurosurgery paged to discuss [KS]   1246 Discussed with Dr. Masters, advised admit to ICU for close neuro monitoring, will plan for surgery likely Friday.  [KS]   1250 Discussed with Earl Vital NP with Trauma Services, agrees w/ admission [KS]      ED Course User Index  [KS] Kay Wall MD                           Clinical Impression:  Final diagnoses:  [S06.5XAA] SDH (subdural hematoma)  [I62.01, I62.03] Acute on chronic intracranial subdural hematoma (Primary)          ED Disposition Condition    Admit                 Kay Wall MD  12/13/23 0892

## 2023-12-13 NOTE — TELEPHONE ENCOUNTER
1015 - MD updated on status  HERBD    0953 - S/w pt's spouse Christine,  she advised me that they are transporting pt to [facility] ED to be further evaluated. She was advised that I will notify the team.  MAXIME    0900 - Discussed w MD. Suggested they proceed to [facility] ED as recommended by [facility].  HERBD    0808 - Pt's son  called reporting pt experienced a head injury approximately a month ago & was evaluated @ the ED in [place], [place] where a CT was performed.    reports since head injury pt has been experiencing increased head pain. Pt's PCP ordered an MRI of the brain which was performed yesterday at [facility].  He reports MRI revealed bleeding on the brain. He reports [facility] attempted to transfer pt for nsgy consult, but were unable to do so d/t bed availability. He reports they were advised to report to [facility] ED but decided not to d/t wait times. He is inquiring on if there are any recommendations or anything we can do from our end.  MAXIME QUINONES 7/18/2023  NOV 7/16/2024  # [phone]

## 2023-12-13 NOTE — ASSESSMENT & PLAN NOTE
Admitted to the ICU   Q.1h neuro checks  Consult neurosurgery   IV fluids  NPO except for meds   Okay for PT and OT   Holding Lovenox due to head bleed   Follow up neurosurgery final recommendations   H2 blocker while NPO   We will restart home medications once entered

## 2023-12-13 NOTE — SUBJECTIVE & OBJECTIVE
No current facility-administered medications on file prior to encounter.     Current Outpatient Medications on File Prior to Encounter   Medication Sig    donepeziL (ARICEPT) 10 MG tablet Take 1 tablet (10 mg total) by mouth once daily.    EPINEPHrine (EPIPEN) 0.3 mg/0.3 mL AtIn Inject 1 each into the muscle once.    furosemide (LASIX) 20 MG tablet Take 20 mg by mouth 3 (three) times a week.    ipratropium (ATROVENT) 42 mcg (0.06 %) nasal spray 2 sprays by Nasal route 4 (four) times daily.    memantine (NAMENDA) 10 MG Tab Take 1 tablet (10 mg total) by mouth 2 (two) times daily.    montelukast (SINGULAIR) 10 mg tablet Take 10 mg by mouth every evening.    omeprazole (PRILOSEC) 40 MG capsule Take 40 mg by mouth.    rosuvastatin (CRESTOR) 20 MG tablet Take 20 mg by mouth once daily.    sildenafiL (VIAGRA) 50 MG tablet Take 50 mg by mouth.    tamsulosin (FLOMAX) 0.4 mg Cap Take 0.4 mg by mouth once daily.    telmisartan (MICARDIS) 40 MG Tab Take by mouth once daily.       Review of patient's allergies indicates:   Allergen Reactions    Allergen ext-venom-honey bee Hives       Past Medical History:   Diagnosis Date    Alzheimer disease     BPH (benign prostatic hyperplasia)     Diabetes mellitus     HLD (hyperlipidemia)     Hypertension     Male erectile dysfunction, unspecified     Pharyngeal dysphagia     TIA (transient ischemic attack)      Past Surgical History:   Procedure Laterality Date    Excision of Skin Lesion       Family History    None       Tobacco Use    Smoking status: Former     Current packs/day: 0.00     Types: Cigarettes     Quit date:      Years since quittin.9    Smokeless tobacco: Never   Substance and Sexual Activity    Alcohol use: Yes     Comment: 1-2x/week    Drug use: Never    Sexual activity: Not on file     Review of Systems   Constitutional:  Negative for chills and fever.   HENT:  Negative for ear pain and trouble swallowing.    Eyes:  Negative for pain and redness.    Respiratory:  Negative for cough and chest tightness.    Cardiovascular:  Negative for chest pain, palpitations and leg swelling.   Gastrointestinal:  Negative for abdominal distention, abdominal pain, nausea and vomiting.   Genitourinary:  Negative for difficulty urinating.   Musculoskeletal:  Negative for back pain and neck pain.   Skin:  Negative for color change, pallor and wound.   Neurological:  Positive for headaches. Negative for dizziness, syncope, speech difficulty, weakness, light-headedness and numbness.   Psychiatric/Behavioral:  Negative for agitation and suicidal ideas.    All other systems reviewed and are negative.    Objective:     Vital Signs (Most Recent):  Temp: 97.5 °F (36.4 °C) (12/13/23 1100)  Pulse: (!) 58 (12/13/23 1202)  Resp: 12 (12/13/23 1202)  BP: 115/69 (12/13/23 1202)  SpO2: 99 % (12/13/23 1202) Vital Signs (24h Range):  Temp:  [97.5 °F (36.4 °C)] 97.5 °F (36.4 °C)  Pulse:  [58-76] 58  Resp:  [12-20] 12  SpO2:  [94 %-99 %] 99 %  BP: (115-147)/(69-85) 115/69     Weight: 77.1 kg (170 lb)  Body mass index is 25.1 kg/m².     Physical Exam  Constitutional:       Appearance: Normal appearance.   HENT:      Head: Normocephalic and atraumatic.      Nose: Nose normal.   Eyes:      Pupils: Pupils are equal, round, and reactive to light.   Cardiovascular:      Rate and Rhythm: Normal rate.      Pulses: Normal pulses.      Comments: Normal peripheral pulses  Pulmonary:      Effort: Pulmonary effort is normal. No respiratory distress.   Chest:      Chest wall: No tenderness.   Abdominal:      General: Abdomen is flat. Bowel sounds are normal. There is no distension.      Palpations: Abdomen is soft.      Tenderness: There is no abdominal tenderness.   Musculoskeletal:         General: No swelling, tenderness, deformity or signs of injury.      Cervical back: Normal range of motion and neck supple. No tenderness.   Skin:     General: Skin is warm and dry.      Capillary Refill: Capillary refill  takes less than 2 seconds.      Findings: No lesion.      Comments: Healing wound to head   Neurological:      General: No focal deficit present.      Mental Status: He is alert and oriented to person, place, and time. Mental status is at baseline.   Psychiatric:         Mood and Affect: Mood normal.         Behavior: Behavior normal.         Thought Content: Thought content normal.         Judgment: Judgment normal.            I have reviewed all pertinent lab results within the past 24 hours.    Significant Diagnostics:  I have reviewed all pertinent imaging results/findings within the past 24 hours.

## 2023-12-13 NOTE — HPI
88-year-old male presents to the hospital status post multiple falls with the last several weeks.  By report 1st fall was 3 or 4 weeks ago and was seen at outside hospital and worked up and CT scans were negative for acute injury and he was discharged.  He continued having headache since this time.  He did have a fall 1 week ago and it was unclear if he hit his head at that time.  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  The patient presents here with his significant other's the bedside.  He was have a history of dementia in his it was neurologic baseline.  He was aware of his name, the situation, his white but he was unable to tell me his name.  He was calm and cooperative.  He has a healing skin tear laceration to the occiput of his head from the initial fall for 5 weeks ago.  Hemodynamically he was stable.  He has been evaluated by Neurosurgery who recommends admission to the ICU with q.1h neuro checks and Keppra.  Lovenox will of course be held.  Family is aware of the plan and agreeable.  Other than dementia the patient was past medical history of gastrointestinal reflux and benign prostatic hypertrophy.

## 2023-12-13 NOTE — H&P
[facility] - Emergency Dept  TICU  History & Physical    Patient Name: Karon Gauthier  MRN: [id number]  Admission Date: 12/13/2023  Attending Physician: Orlando Lozoya MD   Primary Care Provider: Leyda Khoury MD    Patient information was obtained from patient, spouse/SO, and ER records.     Subjective:     Chief Complaint/Reason for Admission: falls, headache    History of Present Illness: 88-year-old male presents to the hospital status post multiple falls with the last several weeks.  By report 1st fall was 3 or 4 weeks ago and was seen at outside hospital and worked up and CT scans were negative for acute injury and he was discharged.  He continued having headache since this time.  He did have a fall 1 week ago and it was unclear if he hit his head at that time.  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  The patient presents here with his significant other's the bedside.  He was have a history of dementia in his it was neurologic baseline.  He was aware of his name, the situation, his white but he was unable to tell me his name.  He was calm and cooperative.  He has a healing skin tear laceration to the occiput of his head from the initial fall for 5 weeks ago.  Hemodynamically he was stable.  He has been evaluated by Neurosurgery who recommends admission to the ICU with q.1h neuro checks and Keppra.  Lovenox will of course be held.  Family is aware of the plan and agreeable.  Other than dementia the patient was past medical history of gastrointestinal reflux and benign prostatic hypertrophy.    No current facility-administered medications on file prior to encounter.     Current Outpatient Medications on File Prior to Encounter   Medication Sig    donepeziL (ARICEPT) 10 MG tablet Take 1 tablet (10 mg total) by mouth once daily.    EPINEPHrine (EPIPEN) 0.3 mg/0.3 mL AtIn Inject 1 each into the  muscle once.    furosemide (LASIX) 20 MG tablet Take 20 mg by mouth 3 (three) times a week.    ipratropium (ATROVENT) 42 mcg (0.06 %) nasal spray 2 sprays by Nasal route 4 (four) times daily.    memantine (NAMENDA) 10 MG Tab Take 1 tablet (10 mg total) by mouth 2 (two) times daily.    montelukast (SINGULAIR) 10 mg tablet Take 10 mg by mouth every evening.    omeprazole (PRILOSEC) 40 MG capsule Take 40 mg by mouth.    rosuvastatin (CRESTOR) 20 MG tablet Take 20 mg by mouth once daily.    sildenafiL (VIAGRA) 50 MG tablet Take 50 mg by mouth.    tamsulosin (FLOMAX) 0.4 mg Cap Take 0.4 mg by mouth once daily.    telmisartan (MICARDIS) 40 MG Tab Take by mouth once daily.       Review of patient's allergies indicates:   Allergen Reactions    Allergen ext-venom-honey bee Hives       Past Medical History:   Diagnosis Date    Alzheimer disease     BPH (benign prostatic hyperplasia)     Diabetes mellitus     HLD (hyperlipidemia)     Hypertension     Male erectile dysfunction, unspecified     Pharyngeal dysphagia     TIA (transient ischemic attack)      Past Surgical History:   Procedure Laterality Date    Excision of Skin Lesion       Family History    None       Tobacco Use    Smoking status: Former     Current packs/day: 0.00     Types: Cigarettes     Quit date:      Years since quittin.9    Smokeless tobacco: Never   Substance and Sexual Activity    Alcohol use: Yes     Comment: 1-2x/week    Drug use: Never    Sexual activity: Not on file     Review of Systems   Constitutional:  Negative for chills and fever.   HENT:  Negative for ear pain and trouble swallowing.    Eyes:  Negative for pain and redness.   Respiratory:  Negative for cough and chest tightness.    Cardiovascular:  Negative for chest pain, palpitations and leg swelling.   Gastrointestinal:  Negative for abdominal distention, abdominal pain, nausea and vomiting.   Genitourinary:  Negative for difficulty urinating.   Musculoskeletal:  Negative for back  pain and neck pain.   Skin:  Negative for color change, pallor and wound.   Neurological:  Positive for headaches. Negative for dizziness, syncope, speech difficulty, weakness, light-headedness and numbness.   Psychiatric/Behavioral:  Negative for agitation and suicidal ideas.    All other systems reviewed and are negative.    Objective:     Vital Signs (Most Recent):  Temp: 97.5 °F (36.4 °C) (12/13/23 1100)  Pulse: (!) 58 (12/13/23 1202)  Resp: 12 (12/13/23 1202)  BP: 115/69 (12/13/23 1202)  SpO2: 99 % (12/13/23 1202) Vital Signs (24h Range):  Temp:  [97.5 °F (36.4 °C)] 97.5 °F (36.4 °C)  Pulse:  [58-76] 58  Resp:  [12-20] 12  SpO2:  [94 %-99 %] 99 %  BP: (115-147)/(69-85) 115/69     Weight: 77.1 kg (170 lb)  Body mass index is 25.1 kg/m².     Physical Exam  Constitutional:       Appearance: Normal appearance.   HENT:      Head: Normocephalic and atraumatic.      Nose: Nose normal.   Eyes:      Pupils: Pupils are equal, round, and reactive to light.   Cardiovascular:      Rate and Rhythm: Normal rate.      Pulses: Normal pulses.      Comments: Normal peripheral pulses  Pulmonary:      Effort: Pulmonary effort is normal. No respiratory distress.   Chest:      Chest wall: No tenderness.   Abdominal:      General: Abdomen is flat. Bowel sounds are normal. There is no distension.      Palpations: Abdomen is soft.      Tenderness: There is no abdominal tenderness.   Musculoskeletal:         General: No swelling, tenderness, deformity or signs of injury.      Cervical back: Normal range of motion and neck supple. No tenderness.   Skin:     General: Skin is warm and dry.      Capillary Refill: Capillary refill takes less than 2 seconds.      Findings: No lesion.      Comments: Healing wound to head   Neurological:      General: No focal deficit present.      Mental Status: He is alert and oriented to person, place, and time. Mental status is at baseline.   Psychiatric:         Mood and Affect: Mood normal.          Behavior: Behavior normal.         Thought Content: Thought content normal.         Judgment: Judgment normal.            I have reviewed all pertinent lab results within the past 24 hours.    Significant Diagnostics:  I have reviewed all pertinent imaging results/findings within the past 24 hours.    Assessment/Plan:     * SDH (subdural hematoma)  Admitted to the ICU   Q.1h neuro checks  Consult neurosurgery   IV fluids  NPO except for meds   Okay for PT and OT   Holding Lovenox due to head bleed   Follow up neurosurgery final recommendations   H2 blocker while NPO   We will restart home medications once entered      VTE Risk Mitigation (From admission, onward)           Ordered     IP VTE HIGH RISK PATIENT  Once         12/13/23 1327     Reason for No Pharmacological VTE Prophylaxis  Once        Question:  Reasons:  Answer:  Physician Provided (leave comment)    12/13/23 1327     Place sequential compression device  Until discontinued         12/13/23 1327                      Jordan M Arceneaux, FNP TICU [facility] - Emergency Dept

## 2023-12-13 NOTE — TELEPHONE ENCOUNTER
S/w pt's spouse Christine,  she advised me that they are transporting pt to [facility] ED to be further evaluated.   She was advised that I will notify the team.

## 2023-12-13 NOTE — FIRST PROVIDER EVALUATION
"Medical screening examination initiated.  I have conducted a focused provider triage encounter, findings are as follows:    Brief history of present illness:  88-year-old male presents to ED for evaluation of multiple falls.  Wife reports patient has been having severe headaches.  Had outpatient MRI done yesterday showing Bilateral SDH L>R; suspects L sided chronic due to multiple loculations. .    Vitals:    12/13/23 1100   BP: (!) 144/85   BP Location: Left arm   Patient Position: Sitting   Pulse: 76   Resp: 20   Temp: 97.5 °F (36.4 °C)   TempSrc: Temporal   SpO2: 98%   Weight: 77.1 kg (170 lb)   Height: 5' 9" (1.753 m)       Pertinent physical exam:  Patient awake and ambulatory.     Brief workup plan:  labs, IV    Preliminary workup initiated; this workup will be continued and followed by the physician or advanced practice provider that is assigned to the patient when roomed.  "

## 2023-12-14 LAB
ALBUMIN SERPL-MCNC: 3.4 G/DL (ref 3.4–4.8)
ALBUMIN/GLOB SERPL: 1.2 RATIO (ref 1.1–2)
ALP SERPL-CCNC: 62 UNIT/L (ref 40–150)
ALT SERPL-CCNC: 28 UNIT/L (ref 0–55)
AST SERPL-CCNC: 22 UNIT/L (ref 5–34)
AV INDEX (PROSTH): 0.36
AV MEAN GRADIENT: 9 MMHG
AV PEAK GRADIENT: 14 MMHG
AV VALVE AREA BY VELOCITY RATIO: 1.17 CM²
AV VALVE AREA: 1.13 CM²
AV VELOCITY RATIO: 0.37
BASOPHILS # BLD AUTO: 0.06 X10(3)/MCL
BASOPHILS NFR BLD AUTO: 0.9 %
BILIRUB SERPL-MCNC: 0.5 MG/DL
BSA FOR ECHO PROCEDURE: 1.94 M2
BUN SERPL-MCNC: 17.3 MG/DL (ref 8.4–25.7)
CALCIUM SERPL-MCNC: 9.1 MG/DL (ref 8.8–10)
CHLORIDE SERPL-SCNC: 107 MMOL/L (ref 98–107)
CO2 SERPL-SCNC: 25 MMOL/L (ref 23–31)
CREAT SERPL-MCNC: 0.92 MG/DL (ref 0.73–1.18)
CRP SERPL-MCNC: 1.5 MG/L
CV ECHO LV RWT: 0.31 CM
DOP CALC AO PEAK VEL: 1.9 M/S
DOP CALC AO VTI: 41.3 CM
DOP CALC LVOT AREA: 3.1 CM2
DOP CALC LVOT DIAMETER: 2 CM
DOP CALC LVOT PEAK VEL: 0.71 M/S
DOP CALC LVOT STROKE VOLUME: 46.79 CM3
DOP CALC MV VTI: 31 CM
DOP CALCLVOT PEAK VEL VTI: 14.9 CM
E WAVE DECELERATION TIME: 222 MSEC
E/A RATIO: 0.81
E/E' RATIO: 11.43 M/S
ECHO LV POSTERIOR WALL: 0.7 CM (ref 0.6–1.1)
EOSINOPHIL # BLD AUTO: 0.41 X10(3)/MCL (ref 0–0.9)
EOSINOPHIL NFR BLD AUTO: 6 %
ERYTHROCYTE [DISTWIDTH] IN BLOOD BY AUTOMATED COUNT: 12.5 % (ref 11.5–17)
FRACTIONAL SHORTENING: 27 % (ref 28–44)
GFR SERPLBLD CREATININE-BSD FMLA CKD-EPI: >60 MLS/MIN/1.73/M2
GLOBULIN SER-MCNC: 2.9 GM/DL (ref 2.4–3.5)
GLUCOSE SERPL-MCNC: 124 MG/DL (ref 82–115)
HCT VFR BLD AUTO: 37.4 % (ref 42–52)
HGB BLD-MCNC: 12.6 G/DL (ref 14–18)
IMM GRANULOCYTES # BLD AUTO: 0.03 X10(3)/MCL (ref 0–0.04)
IMM GRANULOCYTES NFR BLD AUTO: 0.4 %
INTERVENTRICULAR SEPTUM: 0.7 CM (ref 0.6–1.1)
LEFT ATRIUM SIZE: 3.4 CM
LEFT ATRIUM VOLUME INDEX MOD: 11.9 ML/M2
LEFT ATRIUM VOLUME MOD: 22.9 CM3
LEFT INTERNAL DIMENSION IN SYSTOLE: 3.3 CM (ref 2.1–4)
LEFT VENTRICLE DIASTOLIC VOLUME INDEX: 47.88 ML/M2
LEFT VENTRICLE DIASTOLIC VOLUME: 92.4 ML
LEFT VENTRICLE MASS INDEX: 50 G/M2
LEFT VENTRICLE SYSTOLIC VOLUME INDEX: 22.8 ML/M2
LEFT VENTRICLE SYSTOLIC VOLUME: 44.1 ML
LEFT VENTRICULAR INTERNAL DIMENSION IN DIASTOLE: 4.5 CM (ref 3.5–6)
LEFT VENTRICULAR MASS: 95.66 G
LV LATERAL E/E' RATIO: 10 M/S
LV SEPTAL E/E' RATIO: 13.33 M/S
LVOT MG: 1 MMHG
LVOT MV: 0.45 CM/S
LYMPHOCYTES # BLD AUTO: 2.59 X10(3)/MCL (ref 0.6–4.6)
LYMPHOCYTES NFR BLD AUTO: 38 %
MCH RBC QN AUTO: 31.9 PG (ref 27–31)
MCHC RBC AUTO-ENTMCNC: 33.7 G/DL (ref 33–36)
MCV RBC AUTO: 94.7 FL (ref 80–94)
MONOCYTES # BLD AUTO: 0.44 X10(3)/MCL (ref 0.1–1.3)
MONOCYTES NFR BLD AUTO: 6.5 %
MV MEAN GRADIENT: 2 MMHG
MV PEAK A VEL: 0.99 M/S
MV PEAK E VEL: 0.8 M/S
MV PEAK GRADIENT: 5 MMHG
MV STENOSIS PRESSURE HALF TIME: 63 MS
MV VALVE AREA BY CONTINUITY EQUATION: 1.51 CM2
MV VALVE AREA P 1/2 METHOD: 3.49 CM2
NEUTROPHILS # BLD AUTO: 3.29 X10(3)/MCL (ref 2.1–9.2)
NEUTROPHILS NFR BLD AUTO: 48.2 %
NRBC BLD AUTO-RTO: 0 %
OHS LV EJECTION FRACTION SIMPSONS BIPLANE MOD: 59 %
PISA TR MAX VEL: 1.49 M/S
PLATELET # BLD AUTO: 174 X10(3)/MCL (ref 130–400)
PMV BLD AUTO: 8.6 FL (ref 7.4–10.4)
POTASSIUM SERPL-SCNC: 4.5 MMOL/L (ref 3.5–5.1)
PREALB SERPL-MCNC: 23.1 MG/DL (ref 16–42)
PROT SERPL-MCNC: 6.3 GM/DL (ref 5.8–7.6)
PV PEAK GRADIENT: 4 MMHG
PV PEAK VELOCITY: 0.97 M/S
RBC # BLD AUTO: 3.95 X10(6)/MCL (ref 4.7–6.1)
SODIUM SERPL-SCNC: 139 MMOL/L (ref 136–145)
TDI LATERAL: 0.08 M/S
TDI SEPTAL: 0.06 M/S
TDI: 0.07 M/S
TR MAX PG: 9 MMHG
TRICUSPID ANNULAR PLANE SYSTOLIC EXCURSION: 2.48 CM
WBC # SPEC AUTO: 6.82 X10(3)/MCL (ref 4.5–11.5)
Z-SCORE OF LEFT VENTRICULAR DIMENSION IN END DIASTOLE: -1.94
Z-SCORE OF LEFT VENTRICULAR DIMENSION IN END SYSTOLE: -0.15

## 2023-12-14 PROCEDURE — 86140 C-REACTIVE PROTEIN: CPT | Performed by: NURSE PRACTITIONER

## 2023-12-14 PROCEDURE — 84134 ASSAY OF PREALBUMIN: CPT | Performed by: NURSE PRACTITIONER

## 2023-12-14 PROCEDURE — 20800000 HC ICU TRAUMA

## 2023-12-14 PROCEDURE — 85025 COMPLETE CBC W/AUTO DIFF WBC: CPT | Performed by: NURSE PRACTITIONER

## 2023-12-14 PROCEDURE — 93005 ELECTROCARDIOGRAM TRACING: CPT

## 2023-12-14 PROCEDURE — 97162 PT EVAL MOD COMPLEX 30 MIN: CPT

## 2023-12-14 PROCEDURE — 25000003 PHARM REV CODE 250: Performed by: NURSE PRACTITIONER

## 2023-12-14 PROCEDURE — 80053 COMPREHEN METABOLIC PANEL: CPT | Performed by: NURSE PRACTITIONER

## 2023-12-14 PROCEDURE — 97166 OT EVAL MOD COMPLEX 45 MIN: CPT

## 2023-12-14 RX ADMIN — POLYETHYLENE GLYCOL 3350 17 G: 17 POWDER, FOR SOLUTION ORAL at 09:12

## 2023-12-14 RX ADMIN — FAMOTIDINE 20 MG: 20 TABLET, FILM COATED ORAL at 10:12

## 2023-12-14 RX ADMIN — DOCUSATE SODIUM 100 MG: 100 CAPSULE, LIQUID FILLED ORAL at 09:12

## 2023-12-14 RX ADMIN — FAMOTIDINE 20 MG: 20 TABLET, FILM COATED ORAL at 09:12

## 2023-12-14 RX ADMIN — ACETAMINOPHEN 650 MG: 325 TABLET, FILM COATED ORAL at 09:12

## 2023-12-14 RX ADMIN — ACETAMINOPHEN 650 MG: 325 TABLET, FILM COATED ORAL at 06:12

## 2023-12-14 RX ADMIN — ACETAMINOPHEN 650 MG: 325 TABLET, FILM COATED ORAL at 10:12

## 2023-12-14 RX ADMIN — LEVETIRACETAM 500 MG: 500 TABLET, FILM COATED ORAL at 10:12

## 2023-12-14 RX ADMIN — LEVETIRACETAM 500 MG: 500 TABLET, FILM COATED ORAL at 09:12

## 2023-12-14 RX ADMIN — DOCUSATE SODIUM 100 MG: 100 CAPSULE, LIQUID FILLED ORAL at 10:12

## 2023-12-14 RX ADMIN — POLYETHYLENE GLYCOL 3350 17 G: 17 POWDER, FOR SOLUTION ORAL at 10:12

## 2023-12-14 NOTE — CLINICAL REVIEW
Review Status Created By   In Primary Oliver Best MD      Criteria Review   88-year-old male admitted on 12/13/23 to Trauma Services after a fall at home.  Patient has had multiple falls resulting in head injury.  Previous workups showed negative CT.  He was had continued headaches since that time.  MRI was ordered as an outpatient which showed chronic subdural hematomas.  He does have baseline dementia and confusion the wife states that this is worsened over the last few weeks.  He was unable to state his own name.  On admission his vital signs were stable but Neurosurgery concern with ongoing confusion.  He was admitted to ICU for q.1 hour neuro checks and IV Keppra.  He is currently NPO and on IV fluids at 100 cc/hour.  Due to patient's advanced age serious consideration is being taken prior to surgery.  He did have a cardiovascular workup which put him at a class 1 risk for surgical procedure.  Due to patient's advanced age, need for q.1 hour neuro checks, and ICU monitoring would recommend inpatient level of care.  Will send for a peer to peer.    Oliver Best MD  Physician Advisor, [facility]

## 2023-12-14 NOTE — PROGRESS NOTES
[facility] - [facility] ICU  Wound Care    Patient Name:  Karon Gauthier   MRN:  [id number]  Date: 2023  Diagnosis: SDH (subdural hematoma)    History:     Past Medical History:   Diagnosis Date    Alzheimer disease     BPH (benign prostatic hyperplasia)     Diabetes mellitus     HLD (hyperlipidemia)     Hypertension     Male erectile dysfunction, unspecified     Pharyngeal dysphagia     TIA (transient ischemic attack)        Social History     Socioeconomic History    Marital status: Unknown   Tobacco Use    Smoking status: Former     Current packs/day: 0.00     Types: Cigarettes     Quit date:      Years since quittin.9    Smokeless tobacco: Never   Substance and Sexual Activity    Alcohol use: Yes     Comment: 1-2x/week    Drug use: Never       Precautions:     Allergies as of 2023 - Reviewed 2023   Allergen Reaction Noted    Allergen ext-venom-honey bee Hives 2022       [facility] Assessment Details/Treatment     Initial visit regarding affected area at scalp described as traumatic injury incurred with fall at home, affected area at top of head cleansed and assessed and dressed with steri strips. Patient tolerated procedure well.    23 1300        Altered Skin Integrity 23 1300 Scalp Traumatic   Date First Assessed/Time First Assessed: 23 1300   Altered Skin Integrity Present on Admission - Did Patient arrive to the hospital with altered skin?: yes  Location: Scalp  Primary Wound Type: Traumatic   Wound Image    Dressing Appearance Open to air;Dried drainage   Drainage Amount Small   Drainage Characteristics/Odor Serosanguineous   Appearance Pink;Red   Tissue loss description Partial thickness   Periwound Area Intact   Wound Edges Defined   Wound Length (cm) 1.5 cm   Wound Width (cm) 1.5 cm   Wound Depth (cm) 0.1 cm   Wound Volume (cm^3) 0.225 cm^3   Wound Surface Area (cm^2) 2.25 cm^2   Care Antimicrobial agent   Dressing Other (comment)  (steri strips  applied)   Dressing Change Due 12/16/23         Recommendations made to primary team Brianna STAPLETON for local wound care. Orders placed.     12/14/2023

## 2023-12-14 NOTE — PT/OT/SLP EVAL
Occupational Therapy  Evaluation    Name: Karon Gauthier  MRN: [id number]  Admitting Diagnosis: falls/headache  Recent Surgery: Procedure(s) (LRB):  CRANIOTOMY, FOR SUBDURAL HEMATOMA EVACUATION (Bilateral)      Recommendations:     Discharge therapy intensity: Low Intensity Therapy   Discharge Equipment Recommendations:  other (see comments) (TBD)  Barriers to discharge:       Assessment:     Karon Gauthier is a 88 y.o. male with a medical diagnosis of acute on chronic subdural hematoma (cleared with neurosurgery to mobilize with therapy) and history of dementia. He presents with the following performance deficits affecting function: weakness, impaired endurance, impaired self care skills, impaired functional mobility, gait instability, impaired balance, impaired cognition, decreased safety awareness.   Pt tolerated initial evaluation well, motivated to participate. Session activities limited today due to wound care present for cleaning of head wound. Pt required CGA for bed mobility and transfer to bedside chair using RW. Pt able to don socks with Min A due to tight fit while positioned in figure four position. Pt oriented to person only, as pt with history of dementia. Wife present to assist with history. Recommending low intensity therapy upon discharge with 24/7 supervision.    Rehab Prognosis: Good; patient would benefit from acute skilled OT services to address these deficits and reach maximum level of function.       Plan:     Patient to be seen 5 x/week to address the above listed problems via self-care/home management, therapeutic activities, therapeutic exercises  Plan of Care Expires: 01/15/24  Plan of Care Reviewed with: patient, spouse    Subjective     Chief Complaint: none  Patient/Family Comments/goals: to get moving    Occupational Profile:  Living Environment: lives with wife in 2 story home but stays on first level; walk in shower with seat  Previous level of function: Independent  Roles and  Routines: cares for self, cuts grass, father, grandfather  Equipment Used at Home: none  Assistance upon Discharge: wife    Pain/Comfort:  Pain Rating 1: 0/10    Patients cultural, spiritual, [employer] conflicts given the current situation: no    Objective:     OT communicated with RN prior to session.      Patient was found HOB elevated with bed alarm, blood pressure cuff, pulse ox (continuous), telemetry upon OT entry to room.    General Precautions: Standard, fall, other (see comments) (BP<140/90)  Orthopedic Precautions: N/A  Braces: N/A    Vital Signs: Blood Pressure: 138/77  HR: 58  Sp02: 100  Respiratory Status: on room air    Bed Mobility:    Patient completed Supine to Sit with contact guard assistance    Functional Mobility/Transfers:  Patient completed Sit <> Stand Transfer with contact guard assistance  with  rolling walker   Patient completed Bed <> Chair Transfer using Step Transfer technique with contact guard assistance with rolling walker  Functional Mobility: no LOB, CGA at RW stepping to bedside chair; unable to ambulate to toilet today 2/2 wound care present for head wound    Activities of Daily Living:  Lower Body Dressing: minimum assistance for donning socks in figure four position    Functional Cognition:  Orientation: oriented to Person  Safety Awareness: Impaired.      Visual Perceptual Skills:  Localization: WFL  Visual Attention: WFL  Pursuits: WFL  Convergence/Divergence: WFL    Upper Extremity Function:  Right Upper Extremity:   Strength: WFL  Coordination: WFL finger opposition  Sensation: WFL    Left Upper Extremity:  Strength: WFL  Coordination: WFL finger opposition  Sensation: WFL    Balance:   Dynamic sitting balance:SBA  Dynamic standing balance:CGA at RW    Therapeutic Positioning  Risk for acquired pressure injuries is decreased due to ability to get to BSC/toilet with assist.    OT interventions performed during the course of today's session:   Education was provided on  benefits of and recommendations for therapeutic positioning    OT recommendations for therapeutic positioning throughout hospitalization:   Follow [facility] Pressure Injury Prevention Protocol      Patient Education:  Patient and spouse were provided with verbal education education regarding OT role/goals/POC, fall prevention, and safety awareness.  Understanding was verbalized.     Patient left up in chair with all lines intact, call button in reach, chair alarm on, RN notified, and wound care RN present.    GOALS:   Multidisciplinary Problems       Occupational Therapy Goals          Problem: Occupational Therapy    Goal Priority Disciplines Outcome Interventions   Occupational Therapy Goal     OT, PT/OT Ongoing, Progressing    Description: Goals to be met by: 1/14/24     Patient will increase functional independence with ADLs by performing:    UE Dressing with Modified [place].  LE Dressing with Modified [place].  Grooming while standing at sink with Modified [place].  Toileting from toilet with Modified [place] for hygiene and clothing management.   Toilet transfer to toilet with Modified [place].                         History:     Past Medical History:   Diagnosis Date    Alzheimer disease     BPH (benign prostatic hyperplasia)     Diabetes mellitus     HLD (hyperlipidemia)     Hypertension     Male erectile dysfunction, unspecified     Pharyngeal dysphagia     TIA (transient ischemic attack)          Past Surgical History:   Procedure Laterality Date    Excision of Skin Lesion         Time Tracking:     OT Date of Treatment: 12/14/23  OT Start Time: 1133  OT Stop Time: 1148  OT Total Time (min): 15 min    Billable Minutes:Evaluation Mod Complexity    12/14/2023

## 2023-12-14 NOTE — PROGRESS NOTES
No new issues.  Awake, confused.  Alana.  Discussed with wife surgery for subdural-she is hesitent on the the surgery-she will think about it and let me know.

## 2023-12-14 NOTE — PT/OT/SLP EVAL
Physical Therapy Evaluation    Patient Name:  Karon Gauthier   MRN:  [id number]    Recommendations:     Discharge therapy intensity: Low Intensity Therapy   Discharge Equipment Recommendations:  (TBD pending progress)   Barriers to discharge: Ongoing medical needs    Assessment:     Karon Gauthier is a 88 y.o. male admitted with a medical diagnosis of SDH (subdural hematoma).  He presents with the following impairments/functional limitations: impaired endurance, impaired functional mobility, impaired cognition, decreased coordination. Pt tolerated evaluation well, CGA for bed mobility and transfers. Pt oriented to name and , however unable to say his location when asked. Pt independent prior and no use of AD for ambulation. At this time recommending low intensity therapy upon d/c.    Rehab Prognosis: Good; patient would benefit from acute skilled PT services to address these deficits and reach maximum level of function.    Recent Surgery: Procedure(s) (LRB):  CRANIOTOMY, FOR SUBDURAL HEMATOMA EVACUATION (Bilateral)      Plan:     During this hospitalization, patient to be seen 6 x/week to address the identified rehab impairments via gait training, therapeutic activities, therapeutic exercises and progress toward the following goals:    Plan of Care Expires:  24    Subjective     Chief Complaint: none  Patient/Family Comments/goals: return to PLOF  Pain/Comfort:  Pain Rating 1: 0/10    Patients cultural, spiritual, [employer] conflicts given the current situation:      Living Environment:  Pt lives with his wife in 2 story home, however navigating stairs isn't necessary.  Prior to admission, patients level of function was Ind.  Equipment used at home: none.  DME owned (not currently used): .  Upon discharge, patient will have assistance from wife.    Objective:     Communicated with nurse prior to session.  Patient found HOB elevated with telemetry, pulse ox (continuous), blood pressure cuff, bed alarm   upon PT entry to room.    General Precautions: Standard, fall (blood pressure <140/90)  Orthopedic Precautions:N/A   Braces: N/A  Respiratory Status: Room air  Blood Pressure: 138/77, HR 77      Exams:  Cognitive Exam:  Patient is oriented to Person  RLE ROM: WNL  RLE Strength: WFL  LLE ROM: WNL  LLE Strength: WFL  Skin integrity: Visible skin intact      Functional Mobility:  Bed Mobility:     Supine to Sit: contact guard assistance  Transfers:     Sit to Stand:  contact guard assistance with rolling walker  Bed to Chair: contact guard assistance with  rolling walker  using  Step Transfer      AM-PAC 6 CLICK MOBILITY  Total Score:18       Treatment & Education:  Education Provided:  Role and goals of PT, transfer training, bed mobility, gait training, balance training, safety awareness, assistive device, strengthening exercises, and importance of participating in PT to return to PLOF.     Understanding was verbalized, however additional teaching warranted.     Patient left up in chair with all lines intact, call button in reach, and wound care present.    GOALS:   Multidisciplinary Problems       Physical Therapy Goals          Problem: Physical Therapy    Goal Priority Disciplines Outcome Goal Variances Interventions   Physical Therapy Goal     PT, PT/OT Ongoing, Progressing     Description: Goals to be met by: 24     Patient will increase functional independence with mobility by performin. Supine to sit with Modified [place]  2. Sit to stand transfer with Modified [place]  3. Bed to chair transfer with Modified [place] using No Assistive Device  4. Ambulate 200 ft Mod Ind no AD/ LRAD.                         History:     Past Medical History:   Diagnosis Date    Alzheimer disease     BPH (benign prostatic hyperplasia)     Diabetes mellitus     HLD (hyperlipidemia)     Hypertension     Male erectile dysfunction, unspecified     Pharyngeal dysphagia     TIA (transient ischemic attack)         Past Surgical History:   Procedure Laterality Date    Excision of Skin Lesion         Time Tracking:     PT Received On: 12/14/23  PT Start Time: 1134     PT Stop Time: 1147  PT Total Time (min): 13 min     Billable Minutes: Evaluation 13      12/14/2023

## 2023-12-14 NOTE — PLAN OF CARE
Problem: Physical Therapy  Goal: Physical Therapy Goal  Description: Goals to be met by: 24     Patient will increase functional independence with mobility by performin. Supine to sit with Modified [place]  2. Sit to stand transfer with Modified [place]  3. Bed to chair transfer with Modified [place] using No Assistive Device  4. Ambulate 200 ft Mod Ind no AD/ LRAD.    Outcome: Ongoing, Progressing

## 2023-12-14 NOTE — NURSING
Nurses Note -- 4 Eyes      12/14/2023   4:41 AM      Skin assessed during: Daily Assessment      [x] No Altered Skin Integrity Present    [x]Prevention Measures Documented      [] Yes- Altered Skin Integrity Present or Discovered   [] LDA Added if Not in Epic (Describe Wound)   [] New Altered Skin Integrity was Present on Admit and Documented in LDA   [] Wound Image Taken    Wound Care Consulted? No    Attending Nurse:  Nikki Wolff RN/Staff Member:  Michaelle PARTIDA RN

## 2023-12-14 NOTE — PLAN OF CARE
Problem: Occupational Therapy  Goal: Occupational Therapy Goal  Description: Goals to be met by: 1/14/24     Patient will increase functional independence with ADLs by performing:    UE Dressing with Modified [place].  LE Dressing with Modified [place].  Grooming while standing at sink with Modified [place].  Toileting from toilet with Modified [place] for hygiene and clothing management.   Toilet transfer to toilet with Modified [place].    Outcome: Ongoing, Progressing

## 2023-12-14 NOTE — PROGRESS NOTES
Had a very long discussion with patient's wife and daughter-in-law.    We went over diagnosis and treatment plan and option of surgery.    We went over risk and benefits   We went over his current circumstances and his declining Alzheimer's disease and what would happen if he chose to have surgery versus not.    We went over imaging.    Family asked lots of questions.  Answered in a manner in which I felt they could easily understand.      Patient's wife has made the decision not to go through with surgery at this time.  She understands he could get worse but accepts this risk.

## 2023-12-14 NOTE — PROGRESS NOTES
[facility] - [facility]  Pulmonary Critical Care Note      Patient Name: Karon Gauthier  MRN: [id number]  Admission Date: 12/13/2023  Hospital Length of Stay: 1 days  Code Status: Full Code  Attending Provider: Orlando Lozoya MD  Primary Care Provider: Leyda Khoury MD     Subjective:     HPI: 88 Year old Alzeimers disease, DM,HTN,HLD, BPH,Gerd,TIA was presented to the ED after having multiple falls. 1st fall was 3 weeks ago per report and was seen at outside hospital and worked up and CT scans were negative for any bleed and he was discharged.  Due to having continues episodes of  headaches with another fall 1 week ago, MRI was ordered by his primary care provider and noted to have acute on chronic subdural hematomas. Patient presented to the [facility] ED initially and was unable to be transferred  and then came to [facility]  ED since the hospital has neuro surgery services. Neurosurgery evaluated the patient who recommends admission to the TICU with q.1h neuro checks and Keppra for seizure prophy. Patient denies any headache, blurry vision, chest pain, sob at this time.Pleasantly demented at his baseline.        Hospital Course/Significant events:   24 Hour Interval History: n/a    Past Medical History:   Diagnosis Date    Alzheimer disease     BPH (benign prostatic hyperplasia)     Diabetes mellitus     HLD (hyperlipidemia)     Hypertension     Male erectile dysfunction, unspecified     Pharyngeal dysphagia     TIA (transient ischemic attack)        Past Surgical History:   Procedure Laterality Date    Excision of Skin Lesion         Review of patient's allergies indicates:   Allergen Reactions    Allergen ext-venom-honey bee Hives       Current Outpatient Medications   Medication Instructions    donepeziL (ARICEPT) 10 mg, Oral, Daily    EPINEPHrine (EPIPEN) 0.3 mg/0.3 mL AtIn 1 each, Intramuscular, Once    furosemide (LASIX) 20 mg, Oral, Three times weekly    ipratropium (ATROVENT) 42 mcg (0.06  %) nasal spray 2 sprays, Nasal, 4 times daily    memantine (NAMENDA) 10 mg, Oral, 2 times daily    montelukast (SINGULAIR) 10 mg, Oral, Nightly    omeprazole (PRILOSEC) 40 mg, Oral    rosuvastatin (CRESTOR) 20 mg, Oral, Daily    sildenafiL (VIAGRA) 50 mg, Oral    tamsulosin (FLOMAX) 0.4 mg, Oral, Daily    telmisartan (MICARDIS) 40 MG Tab Oral, Daily        Social History:     Social History     Socioeconomic History    Marital status: Unknown   Tobacco Use    Smoking status: Former     Current packs/day: 0.00     Types: Cigarettes     Quit date:      Years since quittin.9    Smokeless tobacco: Never   Substance and Sexual Activity    Alcohol use: Yes     Comment: 1-2x/week    Drug use: Never       History reviewed. No pertinent family history.    ROS Unable to obtain due to patients' condition  Objective:     Vital Signs (Most Recent):  Temp: 97.9 °F (36.6 °C) (23 0400)  Pulse: 65 (23 0500)  Resp: 10 (23 0500)  BP: 108/77 (23 0500)  SpO2: 97 % (23 0500)  Body mass index is 25.1 kg/m².  Weight: 77.1 kg (170 lb) Vital Signs (24h Range):  Temp:  [97.5 °F (36.4 °C)-98 °F (36.7 °C)] 97.9 °F (36.6 °C)  Pulse:  [54-82] 65  Resp:  [10-22] 10  SpO2:  [94 %-100 %] 97 %  BP: (106-157)/(60-87) 108/77     Input/output::     Intake/Output Summary (Last 24 hours) at 2023 0535  Last data filed at 2023 0400  Gross per 24 hour   Intake 100 ml   Output 1335 ml   Net -1235 ml       Physical Exam:   GEN: Demented at baseline  HEENT: Normocephalic, atraumatic, EOMI, PERRLA, healing wound to head  CARDIO: regular rate, regular rhythm, normal S1, S2, no murmurs, rubs, clicks or gallops   PULM/CHEST: clear to auscultation bilaterally, no wheezes, rales or rhonchi, symmetric air entry, no accessory muscle use or distress  ABDOMEN: + BS, soft, non tender, non-distended, no guarding and no rebound. no masses or organomegaly   DERM: No rashes or lesions   EXT: peripheral pulses normal, no  "clubbing or cyanosis. No BLE edema.   NEURO: AAOx3, moves all extremities, strength grossly intact  PSYCH: Normal speech, mentation, and affect        Significant Labs:    Laboratory Studies:   No results for input(s): "PH", "PCO2", "PO2", "HCO3", "POCSATURATED", "BE" in the last 24 hours.  Recent Labs   Lab 12/14/23  0238   WBC 6.82   RBC 3.95*   HGB 12.6*   HCT 37.4*      MCV 94.7*   MCH 31.9*   MCHC 33.7     Recent Labs   Lab 12/14/23  0238   GLUCOSE 124*      K 4.5   CHLORIDE 107   CO2 25   BUN 17.3   CREATININE 0.92   CALCIUM 9.1           ABGs:    No results for input(s): "PH", "PO2", "PCO2", "HCO3", "POCBASEDEF" in the last 168 hours.    Lines/Drains/Airways       None                    Vent:       Current Medications:     Infusions:   sodium chloride 0.9% 100 mL/hr at 12/13/23 1456         Scheduled:   acetaminophen  650 mg Oral Q4H    docusate sodium  100 mg Oral BID    famotidine  20 mg Oral BID    levETIRAcetam  500 mg Oral BID    polyethylene glycol  17 g Oral BID         PRN:   bisacodyL    melatonin    oxyCODONE        Microbiology Data:  Microbiology Results (last 7 days)       ** No results found for the last 168 hours. **             Antibiotics and Day Number of Therapy:  Antibiotics (From admission, onward)      None             Estimated Creatinine Clearance: 57.6 mL/min (based on SCr of 0.92 mg/dL).    Imaging:  CT Head Without Contrast  START OF REPORT:  Technique: CT of the head was performed without intravenous contrast with axial as well as coronal and sagittal images.    Comparison: Comparison is with study dated 2023-12-13 11:28:18.    Dosage Information: Automated exposure control was utilized.    Clinical history: Bleed f/u.    Findings:  Hemorrhage: Acute-on-chronic subdural hematomas are again seen along the cerebral convexities bilaterally and measure approximately 3 cm on the left and 1.1 cm on the right in maximum thickness. There is surrounding mass effect with " "effacement of the convexity sulci. These findings are grossly unchanged since the prior examination. There is mild rightward midline shift by approximately 2 mm, unchanged since the prior examination.  Cerebellum: Unremarkable.  Sella and skull base: The sella appears to be within normal limits for age.  Intracranial calcifications: Incidental note is made of bilateral choroid plexus calcification. Incidental note is made of some pineal region calcification.  Calvarium: No acute linear or depressed skull fracture is seen.    Maxillofacial Structures:  Paranasal sinuses: The visualized paranasal sinuses appear clear with no mucoperiosteal thickening or air fluid levels identified.  Orbits: The orbits appear unremarkable.  Zygomatic arches: The zygomatic arches are intact and unremarkable.  Temporal bones and mastoids: The temporal bones and mastoids appear unremarkable.  TMJ: The mandibular condyles appear normally placed with respect to the mandibular fossa.    Impression:  1. Acute-on-chronic subdural hematomas are again seen along the cerebral convexities bilaterally and measure approximately 3 cm on the left and 1.1 cm on the right in maximum thickness. There is surrounding mass effect with effacement of the convexity sulci. These findings are grossly unchanged since the prior examination. There is mild rightward midline shift by approximately 2 mm, unchanged since the prior examination.  2. Details and other findings as noted above.        2D ECHO Results    No results found for this or any previous visit.      Pulmonary Functions Testing Results:    No results found for: "FEV1", "FVC", "WSD8QWR", "TLC", "DLCO"      Assessment/Plan:     Assessment:  Acute on chronic Subdural hematoma bl, left worse   Alzheimer's disease  BPH  DM  HTN  NLD      Plan:  Admit to TICU for close monitoring  Continue Q.1h neuro checks per NSY  Continue IV fluids and keep NPO AT MN except for meds   Holding Lovenox due to head bleed "   Likely surgery planned by NSY for Friday  Keep BP below 140/90 per NSY  Continue keppra BID for seizure prophy  Repeat CT head today  Consulting cardiology for cardiac clearance today per neurosurgery                 DVT Prophylaxis: SCD  GI prophylaxis: famotidine   Keep HOB elevated > 30°  Maintain blood glucose levels 140-180    32 minutes of critical care was time spent personally by me on the following activities: development of treatment plan with patient or surrogate and bedside caregivers, discussions with consultants, evaluation of patient's response to treatment, examination of patient, ordering and performing treatments and interventions, ordering and review of laboratory studies, ordering and review of radiographic studies, pulse oximetry, re-evaluation of patient's condition.  This critical care time did not overlap with that of any other provider or involve time for any procedures.     Jared Leigh,   Pulmonary/Critical Care  [facility] - [facility]

## 2023-12-14 NOTE — CONSULTS
"Inpatient consult to Cardiology  Consult performed by: Everardo Conte ANP  Consult ordered by: Orlando Lozoya MD  Reason for consult: CRA [facility]    Cardiology  Consult Note    Patient Name: Karon Gauthier  MRN: [id number]  Admission Date: 12/13/2023  Hospital Length of Stay: 1 days  Code Status: Full Code   Attending Provider: Orlando Lozoya MD   Consulting Provider: SUMAN Bennett  Primary Care Physician: Leyda Khoury MD  Principal Problem:SDH (subdural hematoma)    Patient information was obtained from patient, past medical records, and ER records.     Subjective:     Chief Complaint: Reason for Consult: CRA     HPI: Mr. Peña is an 87 y/o male who is known to [facility], Dr. Palacios. The patient presented to [facility] on 12.14.23 s/p Multiple Ground Level Falls over Several Weeks. He apparently had his first fall about 3-4 Weeks ago and was seen at an outside facility for workup and had a CT of the Head which was Negative. He continued to have a headaches and was evaluated by his PCP. 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 He remained at his baseline neurologically with a hx of Dementia. He was admitted to ICU for Close Monitoring and Neurosurgical Evaluation. [facility] has been consulted for [facility].     12.14.23: NAD. "I am ok." Denies CP, SOB and Palps. PT able to Ambulate without symptoms of Angina; Does Sustain Frequent Falls.     PMH: TIAs, HTN, HLD, Edema, ED, GERD, BPH, DM II, Alzheimer's Disease   PSH: Skin Cancer Removal, Back Surgery   Family History: Father, D, 76, CAD; Mother, D, 82, CAD; Sister L, Cancer  Social History: Denies illicit Drug, ETOH and Tobacco Use; Former Smoker, Quit in 1984; Quit 40 + Years Prior and Only Smoked 1 ppd for 30 + Years    Previous Cardiac Diagnostics:   ECHO 12.14.23:  Left Ventricle: The left ventricle is normal in size. Normal wall thickness. Normal wall motion. There is low normal systolic " function with a visually estimated ejection fraction of 50 - 55%. Grade I diastolic dysfunction.  Right Ventricle: Normal right ventricular cavity size. Wall thickness is normal. Right ventricle wall motion  is normal. Systolic function is normal.  Aortic Valve: The aortic valve is a trileaflet valve. There is aortic valve sclerosis. There is annular calcification present. Mildly restricted motion. There is mild to moderate stenosis. Aortic valve area by VTI is 1.13 cm². Aortic valve peak velocity is 1.90 m/s. Mean gradient is 9 mmHg. The dimensionless index is 0.36. There is no significant regurgitation.  Mitral Valve: There is mild regurgitation.  Pulmonic Valve: There is mild to moderate regurgitation.    ECHO 1.17.23:  The study quality is average.   The left ventricle is normal in size. Global left ventricular systolic function is normal. The left ventricular ejection fraction is 55-60%.  Mild mitral annular calcification is noted.  Mild aortic valve stenosis is present. Aortic valve area continuity equation is 1.7 cm².  The trans-aortic mean gradient is 9.8 mmHg.  Aortic DVI- 0.48.  Mild (1+) mitral regurgitation. Mild (1+) tricuspid regurgitation. Trace aortic regurgitation.   The pulmonary artery systolic pressure is 27 mmHg.    ECHO 5.13.21:  The study quality is average.   The left ventricle is normal in size. Global left ventricular systolic function is normal. The left ventricular ejection fraction is 55%.  The right atrium is mildly enlarged.   Mild mitral annular calcification is noted.  Mild  to moderate calcification of the aortic valve is noted with adequate cuspal excursion.  Mild aortic valve stenosis is present. Aortic valve area continuity equation is 1.4 cm². The trans-aortic peak velocity is 2.6 m/s. The trans-aortic mean gradient is 12.9 mmHg. Aortic DVI- 0.42.  Mild (1+) aortic regurgitation. Mild (1+) mitral regurgitation. Mild (1+) tricuspid regurgitation. Mild (1+) pulmonic regurgitation.    The pulmonary artery systolic pressure is 23 mmHg.     Carotid US 3.9.18:  The study quality is average.   1-39% stenosis in the proximal right internal carotid artery based on Bluth Criteria.   Antegrade right vertebral artery flow.   1-39% stenosis in the proximal left internal carotid artery based on Bluth Criteria.   Antegrade left vertebral artery flow.   <50% stenosis noted bilateral external carotid arteries.    MPI 10.14.16:  This is a normal perfusion study, no perfusion defects noted.   This scan is suggestive of low risk for future cardiovascular events.   The left ventricular cavity is noted to be normal on the stress study. The left ventricular ejection fraction was calculated to be 63% and left ventricular global function is normal.   The study quality is good.    Review of patient's allergies indicates:   Allergen Reactions    Allergen ext-venom-honey bee Hives     No current facility-administered medications on file prior to encounter.     Current Outpatient Medications on File Prior to Encounter   Medication Sig    donepeziL (ARICEPT) 10 MG tablet Take 1 tablet (10 mg total) by mouth once daily.    EPINEPHrine (EPIPEN) 0.3 mg/0.3 mL AtIn Inject 1 each into the muscle once.    furosemide (LASIX) 20 MG tablet Take 20 mg by mouth 3 (three) times a week.    ipratropium (ATROVENT) 42 mcg (0.06 %) nasal spray 2 sprays by Nasal route 4 (four) times daily.    memantine (NAMENDA) 10 MG Tab Take 1 tablet (10 mg total) by mouth 2 (two) times daily.    montelukast (SINGULAIR) 10 mg tablet Take 10 mg by mouth every evening.    omeprazole (PRILOSEC) 40 MG capsule Take 40 mg by mouth.    rosuvastatin (CRESTOR) 20 MG tablet Take 20 mg by mouth once daily.    sildenafiL (VIAGRA) 50 MG tablet Take 50 mg by mouth.    tamsulosin (FLOMAX) 0.4 mg Cap Take 0.4 mg by mouth once daily.    telmisartan (MICARDIS) 40 MG Tab Take by mouth once daily.     Review of Systems   Unable to perform ROS: Acuity of condition (Baseline  Dementia)     Objective:     Vital Signs (Most Recent):  Temp: 97.9 °F (36.6 °C) (12/14/23 0400)  Pulse: 78 (12/14/23 1100)  Resp: 14 (12/14/23 1100)  BP: 126/79 (12/14/23 1100)  SpO2: 100 % (12/14/23 1100) Vital Signs (24h Range):  Temp:  [97.5 °F (36.4 °C)-98 °F (36.7 °C)] 97.9 °F (36.6 °C)  Pulse:  [54-82] 78  Resp:  [6-22] 14  SpO2:  [95 %-100 %] 100 %  BP: (102-157)/(60-87) 126/79     Weight: 77.1 kg (170 lb)  Body mass index is 25.1 kg/m².    SpO2: 100 %         Intake/Output Summary (Last 24 hours) at 12/14/2023 1218  Last data filed at 12/14/2023 0800  Gross per 24 hour   Intake 2100 ml   Output 1585 ml   Net 515 ml     Lines/Drains/Airways       Peripheral Intravenous Line  Duration                  Peripheral IV - Single Lumen 12/13/23 1125 20 G Left Antecubital 1 day         Peripheral IV - Single Lumen 12/13/23 1347 20 G Right Antecubital <1 day                  Significant Labs:  Recent Results (from the past 72 hour(s))   Comprehensive metabolic panel    Collection Time: 12/13/23 11:26 AM   Result Value Ref Range    Sodium Level 138 136 - 145 mmol/L    Potassium Level 4.0 3.5 - 5.1 mmol/L    Chloride 103 98 - 107 mmol/L    Carbon Dioxide 29 23 - 31 mmol/L    Glucose Level 178 (H) 82 - 115 mg/dL    Blood Urea Nitrogen 20.2 8.4 - 25.7 mg/dL    Creatinine 1.22 (H) 0.73 - 1.18 mg/dL    Calcium Level Total 10.0 8.8 - 10.0 mg/dL    Protein Total 7.6 5.8 - 7.6 gm/dL    Albumin Level 4.0 3.4 - 4.8 g/dL    Globulin 3.6 (H) 2.4 - 3.5 gm/dL    Albumin/Globulin Ratio 1.1 1.1 - 2.0 ratio    Bilirubin Total 0.5 <=1.5 mg/dL    Alkaline Phosphatase 72 40 - 150 unit/L    Alanine Aminotransferase 36 0 - 55 unit/L    Aspartate Aminotransferase 28 5 - 34 unit/L    eGFR 58 mls/min/1.73/m2   APTT    Collection Time: 12/13/23 11:26 AM   Result Value Ref Range    PTT 25.3 23.2 - 33.7 seconds   Protime-INR    Collection Time: 12/13/23 11:26 AM   Result Value Ref Range    PT 13.0 12.5 - 14.5 seconds    INR 1.0 <=1.3   CBC with  Differential    Collection Time: 12/13/23 11:26 AM   Result Value Ref Range    WBC 7.47 4.50 - 11.50 x10(3)/mcL    RBC 4.42 (L) 4.70 - 6.10 x10(6)/mcL    Hgb 13.6 (L) 14.0 - 18.0 g/dL    Hct 42.7 42.0 - 52.0 %    MCV 96.6 (H) 80.0 - 94.0 fL    MCH 30.8 27.0 - 31.0 pg    MCHC 31.9 (L) 33.0 - 36.0 g/dL    RDW 12.8 11.5 - 17.0 %    Platelet 199 130 - 400 x10(3)/mcL    MPV 8.7 7.4 - 10.4 fL    Neut % 51.0 %    Lymph % 36.8 %    Mono % 5.9 %    Eos % 4.4 %    Basophil % 0.7 %    Lymph # 2.75 0.6 - 4.6 x10(3)/mcL    Neut # 3.81 2.1 - 9.2 x10(3)/mcL    Mono # 0.44 0.1 - 1.3 x10(3)/mcL    Eos # 0.33 0 - 0.9 x10(3)/mcL    Baso # 0.05 <=0.2 x10(3)/mcL    IG# 0.09 (H) 0 - 0.04 x10(3)/mcL    IG% 1.2 %    NRBC% 0.0 %   Lactic acid, plasma    Collection Time: 12/13/23  1:42 PM   Result Value Ref Range    Lactic Acid Level 2.2 0.5 - 2.2 mmol/L   Lactic Acid, Plasma    Collection Time: 12/13/23  3:27 PM   Result Value Ref Range    Lactic Acid Level 3.6 (HH) 0.5 - 2.2 mmol/L   Lactic acid, plasma    Collection Time: 12/13/23  8:06 PM   Result Value Ref Range    Lactic Acid Level 1.1 0.5 - 2.2 mmol/L   Comprehensive metabolic panel    Collection Time: 12/14/23  2:38 AM   Result Value Ref Range    Sodium Level 139 136 - 145 mmol/L    Potassium Level 4.5 3.5 - 5.1 mmol/L    Chloride 107 98 - 107 mmol/L    Carbon Dioxide 25 23 - 31 mmol/L    Glucose Level 124 (H) 82 - 115 mg/dL    Blood Urea Nitrogen 17.3 8.4 - 25.7 mg/dL    Creatinine 0.92 0.73 - 1.18 mg/dL    Calcium Level Total 9.1 8.8 - 10.0 mg/dL    Protein Total 6.3 5.8 - 7.6 gm/dL    Albumin Level 3.4 3.4 - 4.8 g/dL    Globulin 2.9 2.4 - 3.5 gm/dL    Albumin/Globulin Ratio 1.2 1.1 - 2.0 ratio    Bilirubin Total 0.5 <=1.5 mg/dL    Alkaline Phosphatase 62 40 - 150 unit/L    Alanine Aminotransferase 28 0 - 55 unit/L    Aspartate Aminotransferase 22 5 - 34 unit/L    eGFR >60 mls/min/1.73/m2   C-reactive protein    Collection Time: 12/14/23  2:38 AM   Result Value Ref Range     C-Reactive Protein 1.50 <5.00 mg/L   Prealbumin    Collection Time: 12/14/23  2:38 AM   Result Value Ref Range    Prealbumin 23.1 16.0 - 42.0 mg/dL   CBC with Differential    Collection Time: 12/14/23  2:38 AM   Result Value Ref Range    WBC 6.82 4.50 - 11.50 x10(3)/mcL    RBC 3.95 (L) 4.70 - 6.10 x10(6)/mcL    Hgb 12.6 (L) 14.0 - 18.0 g/dL    Hct 37.4 (L) 42.0 - 52.0 %    MCV 94.7 (H) 80.0 - 94.0 fL    MCH 31.9 (H) 27.0 - 31.0 pg    MCHC 33.7 33.0 - 36.0 g/dL    RDW 12.5 11.5 - 17.0 %    Platelet 174 130 - 400 x10(3)/mcL    MPV 8.6 7.4 - 10.4 fL    Neut % 48.2 %    Lymph % 38.0 %    Mono % 6.5 %    Eos % 6.0 %    Basophil % 0.9 %    Lymph # 2.59 0.6 - 4.6 x10(3)/mcL    Neut # 3.29 2.1 - 9.2 x10(3)/mcL    Mono # 0.44 0.1 - 1.3 x10(3)/mcL    Eos # 0.41 0 - 0.9 x10(3)/mcL    Baso # 0.06 <=0.2 x10(3)/mcL    IG# 0.03 0 - 0.04 x10(3)/mcL    IG% 0.4 %    NRBC% 0.0 %   Echo Saline Bubble? No    Collection Time: 12/14/23 11:02 AM   Result Value Ref Range    BSA 1.94 m2    Lin's Biplane MOD Ejection Fraction 59 %    LVOT stroke volume 46.79 cm3    LVIDd 4.50 3.5 - 6.0 cm    LV Systolic Volume 44.10 mL    LV Systolic Volume Index 22.8 mL/m2    LVIDs 3.30 2.1 - 4.0 cm    LV Diastolic Volume 92.40 mL    LV Diastolic Volume Index 47.88 mL/m2    IVS 0.70 0.6 - 1.1 cm    LVOT diameter 2.00 cm    LVOT area 3.1 cm2    FS 27 (A) 28 - 44 %    Left Ventricle Relative Wall Thickness 0.31 cm    Posterior Wall 0.70 0.6 - 1.1 cm    LV mass 95.66 g    LV Mass Index 50 g/m2    MV Peak E Bayron 0.80 m/s    TDI LATERAL 0.08 m/s    TDI SEPTAL 0.06 m/s    E/E' ratio 11.43 m/s    MV Peak A Bayron 0.99 m/s    TR Max Bayron 1.49 m/s    E/A ratio 0.81     E wave deceleration time 222.00 msec    LV SEPTAL E/E' RATIO 13.33 m/s    LV LATERAL E/E' RATIO 10.00 m/s    LVOT peak bayron 0.71 m/s    Left Ventricular Outflow Tract Mean Velocity 0.45 cm/s    Left Ventricular Outflow Tract Mean Gradient 1.00 mmHg    TAPSE 2.48 cm    LA size 3.40 cm    LA volume (mod)  22.90 cm3    LA Volume Index (Mod) 11.9 mL/m2    AV mean gradient 9 mmHg    AV peak gradient 14 mmHg    Ao peak milagros 1.90 m/s    Ao VTI 41.30 cm    LVOT peak VTI 14.90 cm    AV valve area 1.13 cm²    AV Velocity Ratio 0.37     AV index (prosthetic) 0.36     RAY by Velocity Ratio 1.17 cm²    MV mean gradient 2 mmHg    MV peak gradient 5 mmHg    MV stenosis pressure 1/2 time 63.00 ms    MV valve area p 1/2 method 3.49 cm2    MV valve area by continuity eq 1.51 cm2    MV VTI 31.0 cm    Triscuspid Valve Regurgitation Peak Gradient 9 mmHg    PV PEAK VELOCITY 0.97 m/s    PV peak gradient 4 mmHg    Mean e' 0.07 m/s    ZLVIDS -0.15     ZLVIDD -1.94      Significant Imaging:  Imaging Results               CT Head Without Contrast (Final result)  Result time 12/13/23 12:24:15      Final result by Maria Del Carmen Maldonado MD (12/13/23 12:24:15)                   Impression:      Bilateral subdural hematoma seen as outlined above.  The left-sided subdural hematoma shows multiple areas of loculations which suggest a chronic subdural hematoma but also has some areas of hyperattenuation in the posterior parietal region concerning for acute components.    Right-sided subdural hematoma appears to be acute to subacute and has areas acute hemorrhage within.    Midline shift from left to right measuring 3.8 mm    This report was flagged in Epic as abnormal.      Electronically signed by: Terence Maldonado  Date:    12/13/2023  Time:    12:24               Narrative:    EXAMINATION:  CT HEAD WITHOUT CONTRAST    CLINICAL HISTORY:  Subdural hemorrhage;    TECHNIQUE:  Multiple axial images were obtained from the base of the brain to the vertex without contrast administration.  Sagittal and coronal reconstructions were performed. .Automatic exposure control  (AEC) is utilized to reduce patient radiation exposure.    COMPARISON:  MRI from 12/12/2023    FINDINGS:  There are bilateral large subdural hematoma seen.  There is a subdural hematoma seen  on the left side extending from the frontal to the posterior parietal region and towards the cerebral convexity.  The maximum thickness of the subdural hematoma on the left side is 3.1 cm.  Similar areas were seen on the examination performed yesterday.  The previous MRI showed multiple areas of loculation within the there is subdural hemorrhage.  There are also some areas of hyperattenuation within the subdural hemorrhage on the left side.  Findings could represent acute on chronic subdural.    There is a subdural hematoma seen in the right frontal and parietal and temporal region.  This was seen on the prior examination as well.  There appear to be some acute components in the posterior parietal region.  Maximum thickness of the subdural hematoma on the right side is 13 mm and appears similar to the prior examination.  No parenchymal hemorrhage is seen.  There is some mass effect from the left-sided subdural hematoma and there is a midline shift from left to right measuring 3.8 mm.  The ventricles and basal cisterns appear grossly unremarkable.  Posterior fossa appears grossly unremarkable.    Calvarium is intact.  Paranasal sinuses appear unremarkable.                                    EKG:       Telemetry: SR    Physical Exam    Home Medications:   No current facility-administered medications on file prior to encounter.     Current Outpatient Medications on File Prior to Encounter   Medication Sig Dispense Refill    donepeziL (ARICEPT) 10 MG tablet Take 1 tablet (10 mg total) by mouth once daily. 90 tablet 3    EPINEPHrine (EPIPEN) 0.3 mg/0.3 mL AtIn Inject 1 each into the muscle once.      furosemide (LASIX) 20 MG tablet Take 20 mg by mouth 3 (three) times a week.      ipratropium (ATROVENT) 42 mcg (0.06 %) nasal spray 2 sprays by Nasal route 4 (four) times daily.      memantine (NAMENDA) 10 MG Tab Take 1 tablet (10 mg total) by mouth 2 (two) times daily. 180 tablet 3    montelukast (SINGULAIR) 10 mg tablet  Take 10 mg by mouth every evening.      omeprazole (PRILOSEC) 40 MG capsule Take 40 mg by mouth.      rosuvastatin (CRESTOR) 20 MG tablet Take 20 mg by mouth once daily.      sildenafiL (VIAGRA) 50 MG tablet Take 50 mg by mouth.      tamsulosin (FLOMAX) 0.4 mg Cap Take 0.4 mg by mouth once daily.      telmisartan (MICARDIS) 40 MG Tab Take by mouth once daily.       Current Inpatient Medications:    Current Facility-Administered Medications:     0.9%  NaCl infusion, , Intravenous, Continuous, Earl Vital FNP, Last Rate: 100 mL/hr at 12/13/23 1456, New Bag at 12/13/23 1456    acetaminophen tablet 650 mg, 650 mg, Oral, Q4H, Earl Vital FNP, 650 mg at 12/14/23 1043    bisacodyL suppository 10 mg, 10 mg, Rectal, Daily PRN, Earl Vital, VITALY    docusate sodium capsule 100 mg, 100 mg, Oral, BID, Earl Vital FNP, 100 mg at 12/14/23 1043    famotidine tablet 20 mg, 20 mg, Oral, BID, Earl Vital FNP, 20 mg at 12/14/23 1044    levETIRAcetam tablet 500 mg, 500 mg, Oral, BID, Earl Vital FNP, 500 mg at 12/14/23 1044    melatonin tablet 6 mg, 6 mg, Oral, Nightly PRN, Earl Vital, VITALY    oxyCODONE immediate release tablet 5 mg, 5 mg, Oral, Q4H PRN, Earl Vital FNP    perflutren lipid microspheres injection 1.3 mL, 1.3 mL, Intravenous, Once, Everardo Conte, SUMAN    polyethylene glycol packet 17 g, 17 g, Oral, BID, Earl Vital FNP, 17 g at 12/13/23 1523    VTE Risk Mitigation (From admission, onward)           Ordered     IP VTE HIGH RISK PATIENT  Once         12/13/23 1327     Reason for No Pharmacological VTE Prophylaxis  Once        Question:  Reasons:  Answer:  Physician Provided (leave comment)    12/13/23 1327     Place sequential compression device  Until discontinued         12/13/23 1327                  Assessment:   Surgical Risk Stratification    - ECHO (12.14.23) - LVEF 50-55%; Normal Wall Motion    - MPI (10.14.16) - Normal Perfusion  Study  Acute on Chronic Subdural Hematoma Bilaterally, L > R   Alzheimer's Disease  BPH  DM II  HTN  HLD  TIAs  ED  GERD    Plan:   Revised Cardiac Index: 0 Points - Class I Risk/3.9% 30-Day Risk of Death, MI or Cardiac Arrest  ECHO Reviewed  F/U with Dr. Palacios upon D/C  We will be available as needed    Thank you for your consult.     Everardo Conte, ANP  Cardiology  [facility]  12/14/2023      Physician addendum:  I have seen and examined this patient as a split-shared visit with the MINOO d/t complicated medical management of above problems written in assessment and high acuity requiring physician expertise in medical decision-making. I performed the substantive portion of the history and exam. Above medical decision-making is also formulated by me.    Cardiovascular exam:  S1, S2  Lungs:  fine crackles at bases.  Extremities: + edema bilaterally    Plan:  as above.      Osito Torres MD  Cardiologist

## 2023-12-15 VITALS
OXYGEN SATURATION: 100 % | BODY MASS INDEX: 25.18 KG/M2 | DIASTOLIC BLOOD PRESSURE: 74 MMHG | HEIGHT: 69 IN | SYSTOLIC BLOOD PRESSURE: 136 MMHG | TEMPERATURE: 98 F | WEIGHT: 170 LBS | HEART RATE: 79 BPM | RESPIRATION RATE: 37 BRPM

## 2023-12-15 LAB
ABORH RETYPE: NORMAL
ALBUMIN SERPL-MCNC: 3.5 G/DL (ref 3.4–4.8)
ALBUMIN/GLOB SERPL: 1.1 RATIO (ref 1.1–2)
ALP SERPL-CCNC: 66 UNIT/L (ref 40–150)
ALT SERPL-CCNC: 29 UNIT/L (ref 0–55)
AST SERPL-CCNC: 23 UNIT/L (ref 5–34)
BASOPHILS # BLD AUTO: 0.05 X10(3)/MCL
BASOPHILS NFR BLD AUTO: 0.6 %
BILIRUB SERPL-MCNC: 0.5 MG/DL
BUN SERPL-MCNC: 14.9 MG/DL (ref 8.4–25.7)
CALCIUM SERPL-MCNC: 9.4 MG/DL (ref 8.8–10)
CHLORIDE SERPL-SCNC: 107 MMOL/L (ref 98–107)
CO2 SERPL-SCNC: 23 MMOL/L (ref 23–31)
CREAT SERPL-MCNC: 0.97 MG/DL (ref 0.73–1.18)
EOSINOPHIL # BLD AUTO: 0.48 X10(3)/MCL (ref 0–0.9)
EOSINOPHIL NFR BLD AUTO: 5.9 %
ERYTHROCYTE [DISTWIDTH] IN BLOOD BY AUTOMATED COUNT: 12.5 % (ref 11.5–17)
GFR SERPLBLD CREATININE-BSD FMLA CKD-EPI: >60 MLS/MIN/1.73/M2
GLOBULIN SER-MCNC: 3.2 GM/DL (ref 2.4–3.5)
GLUCOSE SERPL-MCNC: 112 MG/DL (ref 82–115)
GROUP & RH: NORMAL
HCT VFR BLD AUTO: 36.7 % (ref 42–52)
HGB BLD-MCNC: 12.5 G/DL (ref 14–18)
IMM GRANULOCYTES # BLD AUTO: 0.03 X10(3)/MCL (ref 0–0.04)
IMM GRANULOCYTES NFR BLD AUTO: 0.4 %
INDIRECT COOMBS GEL: NORMAL
LYMPHOCYTES # BLD AUTO: 3.24 X10(3)/MCL (ref 0.6–4.6)
LYMPHOCYTES NFR BLD AUTO: 39.6 %
MCH RBC QN AUTO: 31.8 PG (ref 27–31)
MCHC RBC AUTO-ENTMCNC: 34.1 G/DL (ref 33–36)
MCV RBC AUTO: 93.4 FL (ref 80–94)
MONOCYTES # BLD AUTO: 0.56 X10(3)/MCL (ref 0.1–1.3)
MONOCYTES NFR BLD AUTO: 6.8 %
NEUTROPHILS # BLD AUTO: 3.83 X10(3)/MCL (ref 2.1–9.2)
NEUTROPHILS NFR BLD AUTO: 46.7 %
NRBC BLD AUTO-RTO: 0 %
PLATELET # BLD AUTO: 172 X10(3)/MCL (ref 130–400)
PMV BLD AUTO: 8.6 FL (ref 7.4–10.4)
POTASSIUM SERPL-SCNC: 4.6 MMOL/L (ref 3.5–5.1)
PROT SERPL-MCNC: 6.7 GM/DL (ref 5.8–7.6)
RBC # BLD AUTO: 3.93 X10(6)/MCL (ref 4.7–6.1)
SODIUM SERPL-SCNC: 138 MMOL/L (ref 136–145)
SPECIMEN OUTDATE: NORMAL
WBC # SPEC AUTO: 8.19 X10(3)/MCL (ref 4.5–11.5)

## 2023-12-15 PROCEDURE — 25000003 PHARM REV CODE 250: Performed by: INTERNAL MEDICINE

## 2023-12-15 PROCEDURE — 86850 RBC ANTIBODY SCREEN: CPT | Performed by: NEUROLOGICAL SURGERY

## 2023-12-15 PROCEDURE — 97116 GAIT TRAINING THERAPY: CPT

## 2023-12-15 PROCEDURE — 25000003 PHARM REV CODE 250: Performed by: NURSE PRACTITIONER

## 2023-12-15 PROCEDURE — 80053 COMPREHEN METABOLIC PANEL: CPT | Performed by: NURSE PRACTITIONER

## 2023-12-15 PROCEDURE — 85025 COMPLETE CBC W/AUTO DIFF WBC: CPT | Performed by: NURSE PRACTITIONER

## 2023-12-15 PROCEDURE — 97535 SELF CARE MNGMENT TRAINING: CPT

## 2023-12-15 RX ORDER — MONTELUKAST SODIUM 10 MG/1
10 TABLET ORAL NIGHTLY
Status: DISCONTINUED | OUTPATIENT
Start: 2023-12-15 | End: 2023-12-15

## 2023-12-15 RX ORDER — DONEPEZIL HYDROCHLORIDE 5 MG/1
10 TABLET, FILM COATED ORAL DAILY
Status: DISCONTINUED | OUTPATIENT
Start: 2023-12-15 | End: 2023-12-15 | Stop reason: HOSPADM

## 2023-12-15 RX ORDER — LEVETIRACETAM 500 MG/1
500 TABLET ORAL 2 TIMES DAILY
Qty: 10 TABLET | Refills: 0 | Status: SHIPPED | OUTPATIENT
Start: 2023-12-15 | End: 2023-12-20

## 2023-12-15 RX ORDER — MONTELUKAST SODIUM 10 MG/1
10 TABLET ORAL NIGHTLY
Status: DISCONTINUED | OUTPATIENT
Start: 2023-12-15 | End: 2023-12-15 | Stop reason: HOSPADM

## 2023-12-15 RX ORDER — MEMANTINE HYDROCHLORIDE 5 MG/1
10 TABLET ORAL 2 TIMES DAILY
Status: DISCONTINUED | OUTPATIENT
Start: 2023-12-15 | End: 2023-12-15 | Stop reason: HOSPADM

## 2023-12-15 RX ADMIN — ACETAMINOPHEN 650 MG: 325 TABLET, FILM COATED ORAL at 12:12

## 2023-12-15 RX ADMIN — MEMANTINE 10 MG: 5 TABLET ORAL at 12:12

## 2023-12-15 RX ADMIN — LEVETIRACETAM 500 MG: 500 TABLET, FILM COATED ORAL at 08:12

## 2023-12-15 RX ADMIN — DOCUSATE SODIUM 100 MG: 100 CAPSULE, LIQUID FILLED ORAL at 08:12

## 2023-12-15 RX ADMIN — ACETAMINOPHEN 650 MG: 325 TABLET, FILM COATED ORAL at 02:12

## 2023-12-15 RX ADMIN — DONEPEZIL HYDROCHLORIDE 10 MG: 5 TABLET, FILM COATED ORAL at 12:12

## 2023-12-15 RX ADMIN — FAMOTIDINE 20 MG: 20 TABLET, FILM COATED ORAL at 08:12

## 2023-12-15 RX ADMIN — POLYETHYLENE GLYCOL 3350 17 G: 17 POWDER, FOR SOLUTION ORAL at 10:12

## 2023-12-15 RX ADMIN — MONTELUKAST 10 MG: 10 TABLET, FILM COATED ORAL at 12:12

## 2023-12-15 NOTE — PT/OT/SLP PROGRESS
Occupational Therapy   Treatment    Name: Karon Gauthier  MRN: [id number]  Admitting Diagnosis:  SDH (subdural hematoma)  Day of Surgery    Recommendations:     Recommended therapy intensity at discharge: Low Intensity Therapy (with 24 hour supervision)   Discharge Equipment Recommendations:  walker, rolling  Barriers to discharge:       Assessment:     Karon Gauthier is a 88 y.o. male with a medical diagnosis of SDH (subdural hematoma).  He presents with the following performance deficits affecting function are weakness, impaired functional mobility, gait instability, impaired cognition, impaired balance, impaired endurance, decreased safety awareness.   Pt with good tolerance to therapy session today, good balance during functional mobility using RW, CGA. Pt's wife reporting she is home 24/7 with patient, educated wife on pt's use of RW for stability as pt with previous falls at home. Educated wife on pt's functional mobility status, recommending being with pt for mobility for optimal safety, but pt did not require physical assistance. Overall pt required CGA for transfer to low toilet, SBA for clothing hygiene in standing. Pt SBA standing at sink with RW for oral hygiene, no LOB or unsteadiness noted. Recommending 24/7 supervision as pt with confusion (history of dementia) and home health therapy for increased strengthening and endurance for further improved safety.    Rehab Prognosis:  Good; patient would benefit from acute skilled OT services to address these deficits and reach maximum level of function.       Plan:     Patient to be seen 5 x/week to address the above listed problems via self-care/home management, therapeutic activities, therapeutic exercises  Plan of Care Expires: 01/15/24  Plan of Care Reviewed with: patient, spouse    Subjective     Pain/Comfort:  Pain Rating 1: 0/10    Objective:     Communicated with: RN prior to session.  Patient found up in chair with blood pressure cuff, pulse ox  (continuous), telemetry, Other (comments) (chair alarm) upon OT entry to room.    General Precautions: Standard, fall, other (see comments) (BP<140/90)    Orthopedic Precautions:N/A  Braces: N/A  Respiratory Status: Room air  Vital Signs: Blood Pressure: 124/76  HR: 66  Sp02: 98     Occupational Performance:     Functional Mobility/Transfers:  Patient completed Sit <> Stand Transfer with contact guard assistance  with  rolling walker   Patient completed Toilet Transfer Step Transfer technique with contact guard assistance with  rolling walker  Functional Mobility: CGA during functional mobility using RW, slight unsteadiness navigating sharp turns between chair and toilet, no overt LOB    Activities of Daily Living:  Grooming: stand by assistance standing at sink for oral care  Toileting: stand by assistance for clothing mgmt in standing (CGA for transfer 2/2 low toilet; pt has raised toilet and grab bars at home)    Therapeutic Positioning    OT interventions performed during the course of today's session in an effort to prevent and/or reduce acquired pressure injuries:   Education was provided on benefits of and recommendations for therapeutic positioning    Skin assessment: all bony prominences were assessed    Findings: no redness or breakdown noted (Mepilex dressing in place at sacrum)    Patient Education:  Patient and spouse were provided with verbal education education regarding OT role/goals/POC, fall prevention, safety awareness, and Discharge/DME recommendations.  Understanding was verbalized.    Patient left up in chair with all lines intact, call button in reach, chair alarm on, RN notified, and wife and son present    GOALS:   Multidisciplinary Problems       Occupational Therapy Goals          Problem: Occupational Therapy    Goal Priority Disciplines Outcome Interventions   Occupational Therapy Goal     OT, PT/OT Ongoing, Progressing    Description: Goals to be met by: 1/14/24     Patient will  increase functional independence with ADLs by performing:    UE Dressing with Modified [place].  LE Dressing with Modified [place].  Grooming while standing at sink with Modified [place].  Toileting from toilet with Modified Kermit for hygiene and clothing management.   Toilet transfer to toilet with Modified Kermit.                         Time Tracking:     OT Date of Treatment: 12/15/23  OT Start Time: 1041  OT Stop Time: 1105  OT Total Time (min): 24 min    Billable Minutes:Self Care/Home Management 24 mins    OT/CROW: OT     Number of CROW visits since last OT visit: 1    12/15/2023

## 2023-12-15 NOTE — PROGRESS NOTES
[facility]  Pulmonary Critical Care Note      Patient Name: Karon Gauthier  MRN: [id number]  Admission Date: 12/13/2023  Hospital Length of Stay: 2 days  Code Status: Full Code  Attending Provider: Orlando Lozoya MD  Primary Care Provider: Leyda Khoury MD     Subjective:     HPI: 88 Year old Alzeimers disease, DM,HTN,HLD, BPH,Gerd,TIA was presented to the ED after having multiple falls. 1st fall was 3 weeks ago per report and was seen at outside hospital and worked up and CT scans were negative for any bleed and he was discharged.  Due to having continues episodes of  headaches with another fall 1 week ago, MRI was ordered by his primary care provider and noted to have acute on chronic subdural hematomas. Patient presented to the [facility] ED initially and was unable to be transferred  and then came to [facility]  ED since the hospital has neuro surgery services. Neurosurgery evaluated the patient who recommends admission to the TICU with q.1h neuro checks and Keppra for seizure prophy. Patient denies any headache, blurry vision, chest pain, sob at this time.Pleasantly demented at his baseline.        Hospital Course/Significant events:   24 Hour Interval History:  No acute events overnight.  As of now, patient's wife stated she does not want any surgical intervention but still wants to talk to her kids unconfirmed.  Continuing q.1 hour neuro checks while in ICU.  Patient is pleasantly demented but denies any headaches, dizziness, nausea, vomiting, chest pain, shortness of breath at this time.    Past Medical History:   Diagnosis Date    Alzheimer disease     BPH (benign prostatic hyperplasia)     Diabetes mellitus     HLD (hyperlipidemia)     Hypertension     Male erectile dysfunction, unspecified     Pharyngeal dysphagia     TIA (transient ischemic attack)        Past Surgical History:   Procedure Laterality Date    Excision of Skin Lesion         Review of patient's allergies  indicates:   Allergen Reactions    Allergen ext-venom-honey bee Hives       Current Outpatient Medications   Medication Instructions    donepeziL (ARICEPT) 10 mg, Oral, Daily    EPINEPHrine (EPIPEN) 0.3 mg/0.3 mL AtIn 1 each, Intramuscular, Once    furosemide (LASIX) 20 mg, Oral, Three times weekly    ipratropium (ATROVENT) 42 mcg (0.06 %) nasal spray 2 sprays, Nasal, 4 times daily    memantine (NAMENDA) 10 mg, Oral, 2 times daily    montelukast (SINGULAIR) 10 mg, Oral, Nightly    omeprazole (PRILOSEC) 40 mg, Oral    rosuvastatin (CRESTOR) 20 mg, Oral, Daily    sildenafiL (VIAGRA) 50 mg, Oral    tamsulosin (FLOMAX) 0.4 mg, Oral, Daily    telmisartan (MICARDIS) 40 MG Tab Oral, Daily        Social History:     Social History     Socioeconomic History    Marital status: Unknown   Tobacco Use    Smoking status: Former     Current packs/day: 0.00     Types: Cigarettes     Quit date:      Years since quittin.9    Smokeless tobacco: Never   Substance and Sexual Activity    Alcohol use: Yes     Comment: 1-2x/week    Drug use: Never       History reviewed. No pertinent family history.    ROS Unable to obtain due to patients' condition  Objective:     Vital Signs (Most Recent):  Temp: 98.5 °F (36.9 °C) (12/15/23 0400)  Pulse: 69 (12/15/23 0500)  Resp: 13 (12/15/23 0500)  BP: (!) 114/57 (12/15/23 0500)  SpO2: 95 % (12/15/23 0500)  Body mass index is 25.1 kg/m².  Weight: 77.1 kg (170 lb) Vital Signs (24h Range):  Temp:  [97.3 °F (36.3 °C)-98.5 °F (36.9 °C)] 98.5 °F (36.9 °C)  Pulse:  [] 69  Resp:  [6-31] 13  SpO2:  [78 %-100 %] 95 %  BP: (102-152)/() 114/57     Input/output::     Intake/Output Summary (Last 24 hours) at 12/15/2023 0562  Last data filed at 12/15/2023 0349  Gross per 24 hour   Intake 2240 ml   Output 2095 ml   Net 145 ml         Physical Exam:   GEN: Demented at baseline  HEENT: Normocephalic, atraumatic, EOMI, PERRLA, healing wound to head  CARDIO: regular rate, regular rhythm, normal S1,  "S2, no murmurs, rubs, clicks or gallops   PULM/CHEST: clear to auscultation bilaterally, no wheezes, rales or rhonchi, symmetric air entry, no accessory muscle use or distress  ABDOMEN: + BS, soft, non tender, non-distended, no guarding and no rebound. no masses or organomegaly   DERM: No rashes or lesions   EXT: peripheral pulses normal, no clubbing or cyanosis. No BLE edema.   NEURO: AAOx3, moves all extremities, strength grossly intact  PSYCH: Normal speech, mentation, and affect        Significant Labs:    Laboratory Studies:   No results for input(s): "PH", "PCO2", "PO2", "HCO3", "POCSATURATED", "BE" in the last 24 hours.  Recent Labs   Lab 12/15/23  0102   WBC 8.19   RBC 3.93*   HGB 12.5*   HCT 36.7*      MCV 93.4   MCH 31.8*   MCHC 34.1       Recent Labs   Lab 12/15/23  0102   GLUCOSE 112      K 4.6   CHLORIDE 107   CO2 23   BUN 14.9   CREATININE 0.97   CALCIUM 9.4             ABGs:    No results for input(s): "PH", "PO2", "PCO2", "HCO3", "POCBASEDEF" in the last 168 hours.    Lines/Drains/Airways       None                    Vent:       Current Medications:     Infusions:   sodium chloride 0.9% 100 mL/hr at 12/13/23 1456         Scheduled:   acetaminophen  650 mg Oral Q4H    docusate sodium  100 mg Oral BID    famotidine  20 mg Oral BID    levETIRAcetam  500 mg Oral BID    perflutren lipid microspheres  1.3 mL Intravenous Once    polyethylene glycol  17 g Oral BID         PRN:   bisacodyL    melatonin    oxyCODONE        Microbiology Data:  Microbiology Results (last 7 days)       ** No results found for the last 168 hours. **             Antibiotics and Day Number of Therapy:  Antibiotics (From admission, onward)      None             Estimated Creatinine Clearance: 54.7 mL/min (based on SCr of 0.97 mg/dL).    Imaging:  Echo Saline Bubble? No    Left Ventricle: The left ventricle is normal in size. Normal wall   thickness. Normal wall motion. There is low normal systolic function with   a " visually estimated ejection fraction of 50 - 55%. Grade I diastolic   dysfunction.    Right Ventricle: Normal right ventricular cavity size. Wall thickness   is normal. Right ventricle wall motion  is normal. Systolic function is   normal.    Aortic Valve: The aortic valve is a trileaflet valve. There is aortic   valve sclerosis. There is annular calcification present. Mildly restricted   motion. There is mild to moderate stenosis. Aortic valve area by VTI is   1.13 cm². Aortic valve peak velocity is 1.90 m/s. Mean gradient is 9 mmHg.   The dimensionless index is 0.36. There is no significant regurgitation.    Mitral Valve: There is mild regurgitation.    Pulmonic Valve: There is mild to moderate regurgitation.  CT Head Without Contrast  Narrative: EXAMINATION:  CT HEAD WITHOUT CONTRAST    CLINICAL HISTORY:  Subdural hemorrhage;Head trauma, moderate-severe;    TECHNIQUE:  Axial scans were obtained from skull base to the vertex.    Coronal and sagittal reconstructions obtained from the axial data.    Automatic exposure control was utilized to limit radiation dose.    Contrast: None    Radiation Dose:    Total DLP: 2124 mGy*cm    COMPARISON:  CT dated 12/13/2023    FINDINGS:  There are stable heterogeneous bilateral subdural hemorrhages measuring up to 1.8 cm in thickness on the left.  There are also subdural hemorrhages along the bilateral cerebral convexities measuring up to 1.2 cm in thickness, also stable.  The brain parenchyma is unchanged.    There is mass effect with 3 mm of rightward midline shift, not significantly changed.  The basal cisterns are patent.  Carotid artery calcifications are noted.  The calvarium and skull base are intact  Impression: Stable bilateral subdural hemorrhages with similar mass effect.    No significant change from the [facility] interpretation.    Electronically signed by: Katharine Bonilla  Date:    12/14/2023  Time:    10:18        2D ECHO Results    No results found for this or  "any previous visit.      Pulmonary Functions Testing Results:    No results found for: "FEV1", "FVC", "VPE7XPG", "TLC", "DLCO"      Assessment/Plan:     Assessment:  Acute on chronic Subdural hematoma bl, left worse   Alzheimer's disease  BPH  DM  HTN  NLD      Plan:  Admit to TICU for close monitoring  Continue Q.1h neuro checks per NSY  Holding Lovenox due to head bleed ; SCD for DVT prophylaxis  Continue keppra BID for seizure prophy  Continue heart healthy diet  Repeat CT head from 12/14/2023 shows stable hematoma with similar mass shift  Cardiology consulted for clearance who stated RCRI of 0 with 3.9% risk of death  Patient's wife made the decision to not go through with any surgical intervention at this time; family discussion ongoing with kids as well  Neurosurgery on board; appreciate their assistance               DVT Prophylaxis: SCD  GI prophylaxis: famotidine   Keep HOB elevated > 30°  Maintain blood glucose levels 140-180    32 minutes of critical care was time spent personally by me on the following activities: development of treatment plan with patient or surrogate and bedside caregivers, discussions with consultants, evaluation of patient's response to treatment, examination of patient, ordering and performing treatments and interventions, ordering and review of laboratory studies, ordering and review of radiographic studies, pulse oximetry, re-evaluation of patient's condition.  This critical care time did not overlap with that of any other provider or involve time for any procedures.     Jared Leigh, DO  Pulmonary/Critical Care  [facility]   "

## 2023-12-15 NOTE — PLAN OF CARE
Spoke to wife and son at bedside regarding home health options and a rolling walker for home use. FOC obtained and placed in chart. Referral sent to [facility] via [facility]. DME referral sent to [facility] via careport.

## 2023-12-15 NOTE — PLAN OF CARE
12/15/23 1025   Discharge Assessment   Assessment Type Discharge Planning Assessment   Confirmed/corrected address, phone number and insurance Yes   Confirmed Demographics Correct on Facesheet   Source of Information family   Communicated AKIRA with patient/caregiver Date not available/Unable to determine   Reason For Admission SDH   People in Home spouse   Do you expect to return to your current living situation? Yes   Do you have help at home or someone to help you manage your care at home? Yes   Who are your caregiver(s) and their phone number(s)? Christine (spouse) [phone]   Prior to hospitilization cognitive status: Unable to Assess   Current cognitive status: Not Oriented to Time;Not Oriented to Place   Walking or Climbing Stairs Difficulty no   Dressing/Bathing Difficulty no   Home Accessibility wheelchair accessible   Home Layout Able to live on 1st floor   Equipment Currently Used at Home none   Do you currently have service(s) that help you manage your care at home? No   Do you take prescription medications? Yes   Do you have prescription coverage? Yes   Coverage [employer]   Who is going to help you get home at discharge? Family   How do you get to doctors appointments? family or friend will provide   Are you on dialysis? No   Do you take coumadin? No   Discharge Plan A Home Health   Discharge Plan B Home Health   DME Needed Upon Discharge  other (see comments)  (TBD)   Discharge Plan discussed with: Spouse/sig other;Adult children   Name(s) and Number(s) Christine and son Ian   Transition of Care Barriers None   OTHER   Name(s) of People in Home Christine and his daughter with Downs     Patient lives with his spouse and down syndrome daughter. Prior to admission patient was independent with ADLs and was mowing the lawn per spouse. At time of assessment patient not oriented to time or place wife and son able to complete dc assessment. Spoke to them about dc plan, spouse would like patient to go home with  home health. Explained to spouse home health is not a sitting service and that a nurse will only be there up to 2 times a week. Also therapy can be arranged but again only able to see patient up to 3 times a week depending on what insurance will allow. Wife is asking for a home health list. Choice list has been printed and placed in room. Physical Address: [address] [phone]

## 2023-12-15 NOTE — PT/OT/SLP PROGRESS
Physical Therapy Treatment    Patient Name:  Karon Gauthier   MRN:  [id number]    Recommendations:     Discharge therapy intensity: Low Intensity Therapy   Discharge Equipment Recommendations: walker, rolling  Barriers to discharge: None    Assessment:     Karon Gauthier is a 88 y.o. male admitted with a medical diagnosis of SDH (subdural hematoma).  He presents with the following impairments/functional limitations: impaired endurance, impaired functional mobility, impaired cognition, decreased coordination. Pt tolerated PT treatment well. CGA for transfers and ambulation 142' w/ RW, minimal unsteadiness with turning but no LOB. Pt needing continual cueing to proceed with ambulation due to confusion. Pt would benefit from low intensity therapy upon d/c and definite use of RW for all ambulation with supervision.     Rehab Prognosis: Good/fair; patient would benefit from acute skilled PT services to address these deficits and reach maximum level of function.    Recent Surgery: Procedure(s) (LRB):  CRANIOTOMY, FOR SUBDURAL HEMATOMA EVACUATION (Bilateral) Day of Surgery    Plan:     During this hospitalization, patient to be seen 6 x/week to address the identified rehab impairments via gait training, therapeutic activities, therapeutic exercises and progress toward the following goals:    Plan of Care Expires:  01/14/24    Subjective     Chief Complaint: none  Patient/Family Comments/goals: pt's wife wanting to take pt home and receive home therapy  Pain/Comfort:  Pain Rating 1: 0/10      Objective:     Communicated with nurse prior to session.  Patient found up in chair with blood pressure cuff, pulse ox (continuous), telemetry (chair alarm) upon PT entry to room.     General Precautions: Standard, fall (BP <140/90)  Orthopedic Precautions: N/A  Braces: N/A  Respiratory Status: Room air  Blood Pressure: 124/76, HR 73  Skin Integrity: Visible skin intact      Functional Mobility:  Transfers:     Sit to Stand:   contact guard assistance with rolling walker  Gait: Pt ambulated 142' w/ RW and CGA, slight unsteadiness with turns but able to correct with no LOB    Therapeutic Activities/Exercises:      Education:  Education Provided:  Role and goals of PT, transfer training, bed mobility, gait training, balance training, safety awareness, assistive device, strengthening exercises, and importance of participating in PT to return to PLOF.    Understanding was verbalized.     Patient left up in chair with all lines intact, call button in reach, and chair alarm on..    GOALS:   Multidisciplinary Problems       Physical Therapy Goals          Problem: Physical Therapy    Goal Priority Disciplines Outcome Goal Variances Interventions   Physical Therapy Goal     PT, PT/OT Ongoing, Progressing     Description: Goals to be met by: 24     Patient will increase functional independence with mobility by performin. Supine to sit with Modified [place]  2. Sit to stand transfer with Modified [place]  3. Bed to chair transfer with Modified [place] using No Assistive Device  4. Ambulate 200 ft Mod Ind no AD/ LRAD.                         Time Tracking:     PT Received On: 12/15/23  PT Start Time: 1026     PT Stop Time: 1036  PT Total Time (min): 10 min     Billable Minutes: Gait Training 10    Treatment Type: Treatment  PT/PTA: PT     Number of PTA visits since last PT visit: 1     12/15/2023

## 2023-12-15 NOTE — HOSPITAL COURSE
88-year-old male initially presented after multiple falls.  He would acute on chronic subdural hematoma.  He was seen by Neurosurgery who initially recommended surgical intervention however the patient refused.  He remains at his neurologic baseline which has a proximal GCS 14 due to chronic dementia.  He was seen by Physical therapy who states that he was safe for home with a rolling walker and home therapy.  He will be discharged today.

## 2023-12-15 NOTE — PLAN OF CARE
12/15/23 1424   Final Note   Assessment Type Final Discharge Note   Anticipated Discharge Disposition Home-Health   Hospital Resources/Appts/Education Provided Post-Acute resouces added to AVS   Post-Acute Status   Post-Acute Authorization HME;Home Health   HME Status Set-up Complete/Auth obtained   Home Health Status Set-up Complete/Auth obtained   Discharge Delays None known at this time     [facility] and [facility] are not able to accept patient due to service area. Informed patient's spouse, referral sent to [facility] via careport. [facility] will admit patient once POC is signed by PCP. [facility] walk[facility], family unable to  walker at facility and has agreed to have walker delivered  to home.

## 2023-12-15 NOTE — DISCHARGE SUMMARY
[facility]  TICU  Discharge Summary      Patient Name: Karon Gauthier  MRN: [id number]  Admission Date: 12/13/2023  Hospital Length of Stay: 2 days  Discharge Date and Time:  12/15/2023 12:06 PM  Attending Physician: Orlando Lozoya MD   Discharging Provider: VITALY Bello  Primary Care Provider: Leyda Khoury MD    HPI:   88-year-old male presents to the hospital status post multiple falls with the last several weeks.  By report 1st fall was 3 or 4 weeks ago and was seen at outside hospital and worked up and CT scans were negative for acute injury and he was discharged.  He continued having headache since this time.  He did have a fall 1 week ago and it was unclear if he hit his head at that time.  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  The patient presents here with his significant other's the bedside.  He was have a history of dementia in his it was neurologic baseline.  He was aware of his name, the situation, his white but he was unable to tell me his name.  He was calm and cooperative.  He has a healing skin tear laceration to the occiput of his head from the initial fall for 5 weeks ago.  Hemodynamically he was stable.  He has been evaluated by Neurosurgery who recommends admission to the ICU with q.1h neuro checks and Keppra.  Lovenox will of course be held.  Family is aware of the plan and agreeable.  Other than dementia the patient was past medical history of gastrointestinal reflux and benign prostatic hypertrophy.    Procedure(s) (LRB):  CRANIOTOMY, FOR SUBDURAL HEMATOMA EVACUATION (Bilateral)      Indwelling Lines/Drains at time of discharge:   Lines/Drains/Airways       None                 Hospital Course: 88-year-old male initially presented after multiple falls.  He would acute on chronic subdural hematoma.  He was seen by Neurosurgery who initially  recommended surgical intervention however the patient refused.  He remains at his neurologic baseline which has a proximal GCS 14 due to chronic dementia.  He was seen by Physical therapy who states that he was safe for home with a rolling walker and home therapy.  He will be discharged today.    Goals of Care Treatment Preferences:  Code Status: Full Code      Consults:   Consults (From admission, onward)          Status Ordering Provider     Inpatient consult to Cardiology  Once        Provider:  Osito Torres MD    Completed PAOLA RICHEY     Inpatient consult to Neurosurgery  Once        Provider:  Fritz Masters MD    Completed DENICE ELY            Significant Diagnostic Studies: N/A    Pending Diagnostic Studies:       None          Final Active Diagnoses:    Diagnosis Date Noted POA    PRINCIPAL PROBLEM:  SDH (subdural hematoma) [S06.5XAA] 12/13/2023 Yes      Problems Resolved During this Admission:      Discharged Condition: fair    Disposition: Home-Health Care [facility]    Follow Up:   Follow-up Information       Ezekiel Palacios II, MD Follow up on 1/3/2024.    Specialty: Cardiology  Why: Hospital Follow Up Appointment @ 9:00AM  Contact information:  [facility].  [address]  [place] [address]  [phone]               Fritz Masters MD Follow up in 2 week(s).    Specialty: Neurosurgery  Contact information:  [address]  [place] [address] [phone]                           Patient Instructions:   No discharge procedures on file.  Medications:  Reconciled Home Medications:      Medication List        START taking these medications      levETIRAcetam 500 MG Tab  Commonly known as: KEPPRA  Take 1 tablet (500 mg total) by mouth 2 (two) times daily. for 5 days            CONTINUE taking these medications      donepeziL 10 MG tablet  Commonly known as: ARICEPT  Take 1 tablet (10 mg total) by mouth once daily.     EPINEPHrine 0.3 mg/0.3 mL Atin  Commonly known as:  EPIPEN  Inject 1 each into the muscle once.     furosemide 20 MG tablet  Commonly known as: LASIX  Take 20 mg by mouth 3 (three) times a week.     ipratropium 42 mcg (0.06 %) nasal spray  Commonly known as: ATROVENT  2 sprays by Nasal route 4 (four) times daily.     memantine 10 MG Tab  Commonly known as: NAMENDA  Take 1 tablet (10 mg total) by mouth 2 (two) times daily.     montelukast 10 mg tablet  Commonly known as: SINGULAIR  Take 10 mg by mouth every evening.     omeprazole 40 MG capsule  Commonly known as: PRILOSEC  Take 40 mg by mouth.     rosuvastatin 20 MG tablet  Commonly known as: CRESTOR  Take 20 mg by mouth once daily.     sildenafiL 50 MG tablet  Commonly known as: VIAGRA  Take 50 mg by mouth.     tamsulosin 0.4 mg Cap  Commonly known as: FLOMAX  Take 0.4 mg by mouth once daily.     telmisartan 40 MG Tab  Commonly known as: MICARDIS  Take by mouth once daily.            Time spent on the discharge of patient: 30 minutes    VITALY Bello  [facility]

## 2023-12-15 NOTE — PROGRESS NOTES
Care update:    Patient already rounded on earlier by Dr. Masters    Spoke with patient's son that is physician as well as wife again.    Went over all options again and risk and benefits of surgery versus no surgery.    Wife is absolutely not interested in any type of surgical intervention.    She would like to take him home with home health.    Physical therapy apparently cleared the patient.    I explained that given the patient's decline in overall status with Alzheimer's as well as acute on chronic subdural hematoma would be grounds for extra supervision.  Patient needs to be watched closely.  He is in extremely high fall risk.  Son voiced understanding.    They are not quite ready for any type of placement at this time and wife is not agreeable to that.    She will take in the patient home with home health.  Safety and fall precautions reiterated several times throughout conversation.

## (undated) DEVICE — SHEET AVIENE MICFIB 1.4X1.4IN

## (undated) DEVICE — DRESSING TELFA N ADH 3X8

## (undated) DEVICE — GLOVE PROTEXIS BLUE LATEX 7

## (undated) DEVICE — ROUTER TAPERED 2.3MM

## (undated) DEVICE — BLADE EZ CLEAN 2 1/2

## (undated) DEVICE — NDL HYPO 22GX1 1/2 SYR 10ML LL

## (undated) DEVICE — MARKER WRITESITE SKIN CHLRAPRP

## (undated) DEVICE — PERFORATOR SURG CRAN 14X11MM

## (undated) DEVICE — KIT SURGICAL TURNOVER

## (undated) DEVICE — BENZOIN TINCTURE 0.66ML

## (undated) DEVICE — SEE MEDLINE ITEM 157103

## (undated) DEVICE — COVER HD BACK TABLE 6FT

## (undated) DEVICE — INSTRUMENT FRAZIER 10FR W/VENT

## (undated) DEVICE — GOWN X-LG STERILE BACK

## (undated) DEVICE — SUT VICRYL 4-0 18 P-3

## (undated) DEVICE — CLOSURE SKIN STERI STRIP 1/2X4

## (undated) DEVICE — GLOVE PROTEXIS LTX MICRO  7.5

## (undated) DEVICE — SUT VICRYL PLUS 3-0 X-1 18IN

## (undated) DEVICE — BAND RUBBER STERILE 1/4X3.5IN

## (undated) DEVICE — SUT 4/0 18IN NUROLON BLK B

## (undated) DEVICE — SUT BONE WAX 2.5 GRMS 12/BX

## (undated) DEVICE — ELECTRODE PATIENT RETURN DISP

## (undated) DEVICE — DISH PETRI MED 3.5IN

## (undated) DEVICE — SOL .9NACL PF 100 ML

## (undated) DEVICE — Device

## (undated) DEVICE — GLOVE PROTEXIS BLUE LATEX 8

## (undated) DEVICE — TRAY SKIN SCRUB WET PREMIUM

## (undated) DEVICE — SOL NACL IRR 1000ML BTL

## (undated) DEVICE — GLOVE PROTEXIS PI SYN SURG 6.5

## (undated) DEVICE — POSITIONER HEEL FOAM CONVOLTD

## (undated) DEVICE — CLIPS RANEY SCALP FFS/ASSY

## (undated) DEVICE — KIT SURGIFLO HEMOSTATIC MATRIX

## (undated) DEVICE — DRAPE SURG W/TWL 17 5/8X23

## (undated) DEVICE — POSITIONER HEAD ADULT

## (undated) DEVICE — HEMOSTAT SURGICEL 2X14IN

## (undated) DEVICE — TUBING IRR BIPOLAR CORD 12FT

## (undated) DEVICE — ROUTER STRAIGHT RED 1.1MM

## (undated) DEVICE — TRAY CATH FOL SIL URIMTR 16FR

## (undated) DEVICE — TUBE SUCTION MEDI-VAC STERILE